# Patient Record
Sex: FEMALE | Race: WHITE | NOT HISPANIC OR LATINO | Employment: FULL TIME | URBAN - METROPOLITAN AREA
[De-identification: names, ages, dates, MRNs, and addresses within clinical notes are randomized per-mention and may not be internally consistent; named-entity substitution may affect disease eponyms.]

---

## 2024-07-23 LAB
EXTERNAL HEPATITIS B SURFACE ANTIGEN: NEGATIVE
EXTERNAL HEPATITIS C AB: NEGATIVE
EXTERNAL RUBELLA QUALITATIVE: NORMAL
EXTERNAL SYPHILIS RPR SCREEN: NORMAL
HIV1 P24 AG SERPL QL IA: NORMAL

## 2024-11-12 ENCOUNTER — HOSPITAL ENCOUNTER (OUTPATIENT)
Dept: ULTRASOUND IMAGING | Facility: HOSPITAL | Age: 34
Discharge: HOME | End: 2024-11-12
Admitting: STUDENT IN AN ORGANIZED HEALTH CARE EDUCATION/TRAINING PROGRAM
Payer: COMMERCIAL

## 2024-11-12 DIAGNOSIS — O30.049 DICHORIONIC DIAMNIOTIC TWIN PREGNANCY, ANTEPARTUM: ICD-10-CM

## 2024-11-12 DIAGNOSIS — O28.3 ABNORMAL FETAL ULTRASOUND: ICD-10-CM

## 2024-11-12 PROCEDURE — 93325 DOPPLER ECHO COLOR FLOW MAPG: CPT

## 2024-11-12 PROCEDURE — 76825 ECHO EXAM OF FETAL HEART: CPT

## 2024-11-12 PROCEDURE — 76827 ECHO EXAM OF FETAL HEART: CPT
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
## 2024-11-15 ENCOUNTER — APPOINTMENT (OUTPATIENT)
Dept: ULTRASOUND IMAGING | Facility: HOSPITAL | Age: 34
End: 2024-11-15
Payer: COMMERCIAL

## 2024-11-15 ENCOUNTER — HOSPITAL ENCOUNTER (INPATIENT)
Facility: HOSPITAL | Age: 34
LOS: 8 days | Discharge: HOME | End: 2024-11-23
Attending: OBSTETRICS & GYNECOLOGY | Admitting: OBSTETRICS & GYNECOLOGY
Payer: COMMERCIAL

## 2024-11-15 PROBLEM — O10.919 CHRONIC HYPERTENSION AFFECTING PREGNANCY: Status: ACTIVE | Noted: 2024-11-15

## 2024-11-15 PROBLEM — O42.919 PRETERM PREMATURE RUPTURE OF MEMBRANES (PPROM) DELIVERED, CURRENT HOSPITALIZATION: Status: ACTIVE | Noted: 2024-11-15

## 2024-11-15 PROBLEM — O99.213 OBESITY AFFECTING PREGNANCY IN THIRD TRIMESTER: Status: ACTIVE | Noted: 2024-11-15

## 2024-11-15 LAB
A1 MICROGLOB PLACENTAL VAG QL: POSITIVE
ABO GROUP BLD: NORMAL
ALBUMIN SERPL-MCNC: 3.6 G/DL (ref 3.5–5.2)
ALBUMIN/GLOB SERPL: 1 G/DL
ALP SERPL-CCNC: 100 U/L (ref 39–117)
ALT SERPL W P-5'-P-CCNC: 15 U/L (ref 1–33)
ANION GAP SERPL CALCULATED.3IONS-SCNC: 14 MMOL/L (ref 5–15)
AST SERPL-CCNC: 20 U/L (ref 1–32)
BACTERIA UR QL AUTO: ABNORMAL /HPF
BILIRUB SERPL-MCNC: 0.2 MG/DL (ref 0–1.2)
BILIRUB UR QL STRIP: NEGATIVE
BLD GP AB SCN SERPL QL: NEGATIVE
BUN SERPL-MCNC: 9 MG/DL (ref 6–20)
BUN/CREAT SERPL: 14.3 (ref 7–25)
CALCIUM SPEC-SCNC: 9.4 MG/DL (ref 8.6–10.5)
CHLORIDE SERPL-SCNC: 104 MMOL/L (ref 98–107)
CLARITY UR: CLEAR
CO2 SERPL-SCNC: 20 MMOL/L (ref 22–29)
COLOR UR: YELLOW
CREAT SERPL-MCNC: 0.63 MG/DL (ref 0.57–1)
CREAT UR-MCNC: 116.1 MG/DL
CREAT UR-MCNC: 194 MG/DL
DEPRECATED RDW RBC AUTO: 36.5 FL (ref 37–54)
EGFRCR SERPLBLD CKD-EPI 2021: 120.3 ML/MIN/1.73
ERYTHROCYTE [DISTWIDTH] IN BLOOD BY AUTOMATED COUNT: 11.2 % (ref 12.3–15.4)
GLOBULIN UR ELPH-MCNC: 3.6 GM/DL
GLUCOSE SERPL-MCNC: 69 MG/DL (ref 65–99)
GLUCOSE UR STRIP-MCNC: NEGATIVE MG/DL
HCT VFR BLD AUTO: 39.3 % (ref 34–46.6)
HGB BLD-MCNC: 13.4 G/DL (ref 12–15.9)
HGB UR QL STRIP.AUTO: NEGATIVE
HYALINE CASTS UR QL AUTO: ABNORMAL /LPF
KETONES UR QL STRIP: ABNORMAL
LEUKOCYTE ESTERASE UR QL STRIP.AUTO: NEGATIVE
MCH RBC QN AUTO: 30.7 PG (ref 26.6–33)
MCHC RBC AUTO-ENTMCNC: 34.1 G/DL (ref 31.5–35.7)
MCV RBC AUTO: 90.1 FL (ref 79–97)
NITRITE UR QL STRIP: NEGATIVE
PH UR STRIP.AUTO: 6 [PH] (ref 5–8)
PLATELET # BLD AUTO: 397 10*3/MM3 (ref 140–450)
PMV BLD AUTO: 10.4 FL (ref 6–12)
POTASSIUM SERPL-SCNC: 4.1 MMOL/L (ref 3.5–5.2)
PROT ?TM UR-MCNC: 22.8 MG/DL
PROT ?TM UR-MCNC: 49.1 MG/DL
PROT SERPL-MCNC: 7.2 G/DL (ref 6–8.5)
PROT UR QL STRIP: ABNORMAL
PROT/CREAT UR: 196.4 MG/G CREA (ref 0–200)
PROT/CREAT UR: 253.1 MG/G CREA (ref 0–200)
QT INTERVAL: 388 MS
QTC INTERVAL: 462 MS
RBC # BLD AUTO: 4.36 10*6/MM3 (ref 3.77–5.28)
RBC # UR STRIP: ABNORMAL /HPF
REF LAB TEST METHOD: ABNORMAL
RH BLD: POSITIVE
SODIUM SERPL-SCNC: 138 MMOL/L (ref 136–145)
SP GR UR STRIP: 1.02 (ref 1–1.03)
SQUAMOUS #/AREA URNS HPF: ABNORMAL /HPF
T&S EXPIRATION DATE: NORMAL
TREPONEMA PALLIDUM IGG+IGM AB [PRESENCE] IN SERUM OR PLASMA BY IMMUNOASSAY: NORMAL
UROBILINOGEN UR QL STRIP: ABNORMAL
WBC # UR STRIP: ABNORMAL /HPF
WBC NRBC COR # BLD AUTO: 15.65 10*3/MM3 (ref 3.4–10.8)

## 2024-11-15 PROCEDURE — 59025 FETAL NON-STRESS TEST: CPT | Performed by: OBSTETRICS & GYNECOLOGY

## 2024-11-15 PROCEDURE — 86901 BLOOD TYPING SEROLOGIC RH(D): CPT | Performed by: OBSTETRICS & GYNECOLOGY

## 2024-11-15 PROCEDURE — 84156 ASSAY OF PROTEIN URINE: CPT | Performed by: OBSTETRICS & GYNECOLOGY

## 2024-11-15 PROCEDURE — 86850 RBC ANTIBODY SCREEN: CPT | Performed by: OBSTETRICS & GYNECOLOGY

## 2024-11-15 PROCEDURE — 25810000003 LACTATED RINGERS PER 1000 ML: Performed by: OBSTETRICS & GYNECOLOGY

## 2024-11-15 PROCEDURE — 99232 SBSQ HOSP IP/OBS MODERATE 35: CPT | Performed by: OBSTETRICS & GYNECOLOGY

## 2024-11-15 PROCEDURE — 80053 COMPREHEN METABOLIC PANEL: CPT | Performed by: OBSTETRICS & GYNECOLOGY

## 2024-11-15 PROCEDURE — 82570 ASSAY OF URINE CREATININE: CPT | Performed by: OBSTETRICS & GYNECOLOGY

## 2024-11-15 PROCEDURE — 99222 1ST HOSP IP/OBS MODERATE 55: CPT | Performed by: STUDENT IN AN ORGANIZED HEALTH CARE EDUCATION/TRAINING PROGRAM

## 2024-11-15 PROCEDURE — 84112 EVAL AMNIOTIC FLUID PROTEIN: CPT | Performed by: OBSTETRICS & GYNECOLOGY

## 2024-11-15 PROCEDURE — 85027 COMPLETE CBC AUTOMATED: CPT | Performed by: OBSTETRICS & GYNECOLOGY

## 2024-11-15 PROCEDURE — 93010 ELECTROCARDIOGRAM REPORT: CPT | Performed by: INTERNAL MEDICINE

## 2024-11-15 PROCEDURE — 99202 OFFICE O/P NEW SF 15 MIN: CPT | Performed by: OBSTETRICS & GYNECOLOGY

## 2024-11-15 PROCEDURE — 86780 TREPONEMA PALLIDUM: CPT | Performed by: OBSTETRICS & GYNECOLOGY

## 2024-11-15 PROCEDURE — 76819 FETAL BIOPHYS PROFIL W/O NST: CPT

## 2024-11-15 PROCEDURE — 25010000002 AMPICILLIN PER 500 MG: Performed by: OBSTETRICS & GYNECOLOGY

## 2024-11-15 PROCEDURE — 86900 BLOOD TYPING SEROLOGIC ABO: CPT | Performed by: OBSTETRICS & GYNECOLOGY

## 2024-11-15 PROCEDURE — 93005 ELECTROCARDIOGRAM TRACING: CPT | Performed by: OBSTETRICS & GYNECOLOGY

## 2024-11-15 PROCEDURE — 87086 URINE CULTURE/COLONY COUNT: CPT | Performed by: OBSTETRICS & GYNECOLOGY

## 2024-11-15 PROCEDURE — 76819 FETAL BIOPHYS PROFIL W/O NST: CPT | Performed by: OBSTETRICS & GYNECOLOGY

## 2024-11-15 PROCEDURE — 25010000002 BETAMETHASONE ACET & SOD PHOS PER 4 MG: Performed by: OBSTETRICS & GYNECOLOGY

## 2024-11-15 PROCEDURE — 81001 URINALYSIS AUTO W/SCOPE: CPT | Performed by: OBSTETRICS & GYNECOLOGY

## 2024-11-15 PROCEDURE — 25010000002 MAGNESIUM SULFATE 20 GM/500ML SOLUTION: Performed by: OBSTETRICS & GYNECOLOGY

## 2024-11-15 RX ORDER — CARVEDILOL 12.5 MG/1
12.5 TABLET ORAL 2 TIMES DAILY WITH MEALS
COMMUNITY
End: 2024-11-23 | Stop reason: HOSPADM

## 2024-11-15 RX ORDER — SODIUM CHLORIDE 0.9 % (FLUSH) 0.9 %
10 SYRINGE (ML) INJECTION AS NEEDED
Status: DISCONTINUED | OUTPATIENT
Start: 2024-11-15 | End: 2024-11-22

## 2024-11-15 RX ORDER — MAGNESIUM SULFATE HEPTAHYDRATE 40 MG/ML
2 INJECTION, SOLUTION INTRAVENOUS CONTINUOUS
Status: DISCONTINUED | OUTPATIENT
Start: 2024-11-15 | End: 2024-11-16

## 2024-11-15 RX ORDER — NIFEDIPINE 30 MG/1
30 TABLET, EXTENDED RELEASE ORAL
Status: DISCONTINUED | OUTPATIENT
Start: 2024-11-16 | End: 2024-11-17

## 2024-11-15 RX ORDER — ONDANSETRON 2 MG/ML
4 INJECTION INTRAMUSCULAR; INTRAVENOUS EVERY 8 HOURS PRN
Status: DISCONTINUED | OUTPATIENT
Start: 2024-11-15 | End: 2024-11-22

## 2024-11-15 RX ORDER — AMOXICILLIN 250 MG/1
500 CAPSULE ORAL EVERY 8 HOURS
Status: COMPLETED | OUTPATIENT
Start: 2024-11-17 | End: 2024-11-22

## 2024-11-15 RX ORDER — NIFEDIPINE 10 MG/1
10 CAPSULE ORAL ONCE
Status: COMPLETED | OUTPATIENT
Start: 2024-11-15 | End: 2024-11-15

## 2024-11-15 RX ORDER — ONDANSETRON 4 MG/1
8 TABLET, ORALLY DISINTEGRATING ORAL EVERY 8 HOURS PRN
Status: DISCONTINUED | OUTPATIENT
Start: 2024-11-15 | End: 2024-11-22

## 2024-11-15 RX ORDER — SODIUM CHLORIDE, SODIUM LACTATE, POTASSIUM CHLORIDE, CALCIUM CHLORIDE 600; 310; 30; 20 MG/100ML; MG/100ML; MG/100ML; MG/100ML
75 INJECTION, SOLUTION INTRAVENOUS CONTINUOUS
Status: DISCONTINUED | OUTPATIENT
Start: 2024-11-15 | End: 2024-11-16

## 2024-11-15 RX ORDER — BETAMETHASONE SODIUM PHOSPHATE AND BETAMETHASONE ACETATE 3; 3 MG/ML; MG/ML
12 INJECTION, SUSPENSION INTRA-ARTICULAR; INTRALESIONAL; INTRAMUSCULAR; SOFT TISSUE EVERY 24 HOURS
Status: COMPLETED | OUTPATIENT
Start: 2024-11-15 | End: 2024-11-16

## 2024-11-15 RX ORDER — DOCUSATE SODIUM 100 MG/1
100 CAPSULE, LIQUID FILLED ORAL 2 TIMES DAILY PRN
Status: DISCONTINUED | OUTPATIENT
Start: 2024-11-15 | End: 2024-11-22

## 2024-11-15 RX ORDER — NIFEDIPINE 30 MG/1
30 TABLET, EXTENDED RELEASE ORAL
Status: DISCONTINUED | OUTPATIENT
Start: 2024-11-16 | End: 2024-11-15

## 2024-11-15 RX ORDER — BISACODYL 10 MG
10 SUPPOSITORY, RECTAL RECTAL DAILY PRN
Status: DISCONTINUED | OUTPATIENT
Start: 2024-11-15 | End: 2024-11-22

## 2024-11-15 RX ORDER — CARVEDILOL 12.5 MG/1
12.5 TABLET ORAL 2 TIMES DAILY WITH MEALS
Status: DISCONTINUED | OUTPATIENT
Start: 2024-11-15 | End: 2024-11-18

## 2024-11-15 RX ORDER — PRENATAL VIT/IRON FUM/FOLIC AC 27MG-0.8MG
1 TABLET ORAL DAILY
Status: DISCONTINUED | OUTPATIENT
Start: 2024-11-16 | End: 2024-11-23 | Stop reason: HOSPADM

## 2024-11-15 RX ORDER — CALCIUM GLUCONATE 94 MG/ML
1 INJECTION, SOLUTION INTRAVENOUS ONCE AS NEEDED
Status: DISCONTINUED | OUTPATIENT
Start: 2024-11-15 | End: 2024-11-16

## 2024-11-15 RX ORDER — NIFEDIPINE 30 MG/1
30 TABLET, EXTENDED RELEASE ORAL 2 TIMES DAILY
Status: DISCONTINUED | OUTPATIENT
Start: 2024-11-16 | End: 2024-11-15

## 2024-11-15 RX ORDER — AZITHROMYCIN 250 MG/1
1000 TABLET, FILM COATED ORAL ONCE
Status: COMPLETED | OUTPATIENT
Start: 2024-11-15 | End: 2024-11-15

## 2024-11-15 RX ORDER — ACETAMINOPHEN 325 MG/1
650 TABLET ORAL EVERY 4 HOURS PRN
Status: DISCONTINUED | OUTPATIENT
Start: 2024-11-15 | End: 2024-11-22

## 2024-11-15 RX ADMIN — ACETAMINOPHEN 325MG 650 MG: 325 TABLET ORAL at 14:42

## 2024-11-15 RX ADMIN — AZITHROMYCIN 1000 MG: 250 TABLET, FILM COATED ORAL at 14:32

## 2024-11-15 RX ADMIN — NIFEDIPINE 10 MG: 10 CAPSULE ORAL at 15:09

## 2024-11-15 RX ADMIN — SODIUM CHLORIDE, SODIUM LACTATE, POTASSIUM CHLORIDE, CALCIUM CHLORIDE 75 ML/HR: 20; 30; 600; 310 INJECTION, SOLUTION INTRAVENOUS at 12:28

## 2024-11-15 RX ADMIN — AMPICILLIN SODIUM 2000 MG: 2 INJECTION, POWDER, FOR SOLUTION INTRAMUSCULAR; INTRAVENOUS at 18:36

## 2024-11-15 RX ADMIN — MAGNESIUM SULFATE IN WATER 2 G/HR: 20 INJECTION, SOLUTION INTRAVENOUS at 20:21

## 2024-11-15 RX ADMIN — BETAMETHASONE SODIUM PHOSPHATE AND BETAMETHASONE ACETATE 12 MG: 3; 3 INJECTION, SUSPENSION INTRA-ARTICULAR; INTRALESIONAL; INTRAMUSCULAR at 11:37

## 2024-11-15 RX ADMIN — CARVEDILOL 12.5 MG: 12.5 TABLET, FILM COATED ORAL at 14:32

## 2024-11-15 RX ADMIN — AMPICILLIN SODIUM 2000 MG: 2 INJECTION, POWDER, FOR SOLUTION INTRAMUSCULAR; INTRAVENOUS at 13:00
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
## 2024-11-15 NOTE — PLAN OF CARE
Problem: Adult Inpatient Plan of Care  Goal: Plan of Care Review  Outcome: Progressing  Flowsheets (Taken 11/15/2024 1746)  Progress: improving  Outcome Evaluation: Patient admitted for PROM. Magnesium started for neuroprotection. Blood pressures WDL.  Plan of Care Reviewed With:   patient   spouse   family  Goal: Patient-Specific Goal (Individualized)  Outcome: Progressing  Flowsheets (Taken 11/15/2024 1746)  Patient/Family-Specific Goals (Include Timeframe): Prolonged delivery for 6 weeks  Individualized Care Needs: Blood pressures WDL and no contractions noted  Anxieties, Fears or Concerns: First Babies  Goal: Absence of Hospital-Acquired Illness or Injury  Outcome: Progressing  Intervention: Identify and Manage Fall Risk  Description: Perform standard risk assessment on admission using a validated tool or comprehensive approach appropriate to the patient; reassess fall risk frequently, with change in status or transfer to another level of care.  Communicate risk to interprofessional healthcare team; ensure fall risk visible cue.  Determine need for increased observation, equipment and environmental modification, as well as use of supportive, nonskid footwear.  Adjust safety measures to individual needs and identified risk factors.  Reinforce the importance of active participation with fall risk prevention, safety, and physical activity with the patient and family.  Perform regular intentional rounding to assess need for position change, pain assessment and personal needs, including assistance with toileting.  Recent Flowsheet Documentation  Taken 11/15/2024 1232 by Navin Ware RN  Safety Promotion/Fall Prevention:   safety round/check completed   room organization consistent   nonskid shoes/slippers when out of bed   fall prevention program maintained   clutter free environment maintained   assistive device/personal items within reach  Intervention: Prevent and Manage VTE (Venous Thromboembolism)  Risk  Description: Assess for VTE (venous thromboembolism) risk.  Promote early mobilization; encourage both active and passive leg exercises, if unable to ambulate.  Initiate and maintain compression or other therapy, as indicated, based on identified risk in accordance with organizational protocol and provider order.  Recognize the patient's individual risk for bleeding before initiating pharmacologic thromboprophylaxis.  Recent Flowsheet Documentation  Taken 11/15/2024 1300 by Navin Ware RN  VTE Prevention/Management:   SCDs (sequential compression devices) on   bilateral  Goal: Optimal Comfort and Wellbeing  Outcome: Progressing  Intervention: Provide Person-Centered Care  Description: Use a family-focused approach to care; encourage support system presence and participation.  Develop trust and rapport by proactively providing information, encouraging questions, addressing concerns and offering reassurance.  Acknowledge emotional response to hospitalization.  Recognize and utilize personal coping strategies and strengths; develop goals via shared decision-making.  Honor spiritual and cultural preferences.  Recent Flowsheet Documentation  Taken 11/15/2024 1232 by Navin Ware RN  Trust Relationship/Rapport:   care explained   emotional support provided   choices provided   empathic listening provided   questions answered   questions encouraged   reassurance provided   thoughts/feelings acknowledged  Goal: Readiness for Transition of Care  Outcome: Progressing  Intervention: Mutually Develop Transition Plan  Description: Identify available resources for support (e.g., family, friends, community).  Identify and address barriers to ongoing treatment and home management (e.g., environmental, financial).  Provide opportunities to practice self-management skills.  Assess and monitor emotional readiness for transition.  Establish or reconnect linkage with outpatient providers or community-based services.  Recent  Flowsheet Documentation  Taken 11/15/2024 1126 by Navin Ware, RN  Equipment Currently Used at Home: none  Taken 11/15/2024 1121 by Navin Ware, RN  Transportation Anticipated: family or friend will provide  Patient/Family Anticipated Services at Transition: none  Patient/Family Anticipates Transition to: home with family   Goal Outcome Evaluation:  Plan of Care Reviewed With: patient, spouse, family        Progress: improving  Outcome Evaluation: Patient admitted for PROM. Magnesium started for neuroprotection. Blood pressures WDL.
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
## 2024-11-15 NOTE — CONSULTS
PRENATAL CONSULTATION NOTE     DATE: 11/15/2024  MRN: [id number]      Patient Name: Nancy Cormier  YOB: 1990    Requesting Physician:  Compa    EDC: 2025, by Other Basis    GA: 28w0d    Estimated fetal weight: unknown    Reason for Consultation: potential premature twins at 28 weeks gestation    Maternal History: 33 y.o.  with chronic HTN, di-di twin gestation, premature rupture of membranes, VSD of Twin B    Consult:  paternal grandmother, maternal grandmother, and mother available for discussion.  We reviewed the fetal and  aspects of the above conditions to include: estimated survival rate, estimated intact survival rate, respiratory distress syndrome, transient tachypnea of the , beneficial effects of steroids, early onset of sepsis, intraventricular hemorrhage, cerebral palsy, mental retardation, retinopathy of prematurity, deafness, chronic lung disease, necrotizing enterocolitis, late onset sepsis, apnea of prematurity, anemia of prematurity, feeding difficulty, temperature instability, and jaundice    Plan for Resuscitation: full resuscitation    Offered estimation of prognosis of potential length of infant hospitalization.     We will plan to attend delivery if requested.     I discussed the importance of providing human milk for all infants, especially premature infants. The patient does plan on providing pumped breast milk and/or nursing when appropriate.    I also reviewed policies and procedures for NICU/ICN admission and reviewed structure and function of [facility] at [facility].    Thank you for allowing us to participate in the care of this patient. [facility] always available for follow-up consultation as indicated. Please do not hesitate to call for additional questions or issues.    Additional Comments: Mother presented today of PROM--at the time of consult undetermined which twin is ruptured, receiving latency abx, BMZ and on mag sulfate.  History of CHTN---on carvedilol and procardia. Expecting 2 male infants, names to be determined. Twin B with known apical muscular VSD--will plan for echo after delivery when clinically appropriate.    AYAN Sesay   Nurse Practitioner  [facility] - Neonatology  [facility]  11/15/24 @ 16:16 EST    Consult note reviewed and electronically signed on 11/15/2024 at 16:16 EST    INITIAL CONSULT:  A total of 45 minutes were spent on counseling and coordination of care including discussion with physicians and nursing, and reviewing the findings and recommendations with the patient and her family of which 50 percent were spent face-to-face with the patient during this encounter.    Thank you for requesting this consult. Please contact the [facility] on-call  Provider for any further questions/concerns, by calling the NICU at extension [phone].        DISCLAIMER:       At [facility], we believe that sharing information builds trust and better relationships. You are receiving this note because you or your baby are receiving care at [facility] or recently visited. It is possible you will see health information before a provider has talked with you about it. This kind of information can be easy to misunderstand. To help you fully understand what it means for your health, we urge you to discuss this note with your provider.
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
## 2024-11-16 LAB — BACTERIA SPEC AEROBE CULT: NORMAL

## 2024-11-16 PROCEDURE — 25010000002 AMPICILLIN PER 500 MG: Performed by: OBSTETRICS & GYNECOLOGY

## 2024-11-16 PROCEDURE — 25010000002 MAGNESIUM SULFATE 20 GM/500ML SOLUTION: Performed by: OBSTETRICS & GYNECOLOGY

## 2024-11-16 PROCEDURE — 25810000003 LACTATED RINGERS PER 1000 ML: Performed by: OBSTETRICS & GYNECOLOGY

## 2024-11-16 PROCEDURE — 59025 FETAL NON-STRESS TEST: CPT

## 2024-11-16 PROCEDURE — 25010000002 BETAMETHASONE ACET & SOD PHOS PER 4 MG: Performed by: OBSTETRICS & GYNECOLOGY

## 2024-11-16 RX ORDER — ASPIRIN 81 MG/1
81 TABLET ORAL DAILY
Status: DISCONTINUED | OUTPATIENT
Start: 2024-11-16 | End: 2024-11-22

## 2024-11-16 RX ADMIN — CARVEDILOL 12.5 MG: 12.5 TABLET, FILM COATED ORAL at 18:20

## 2024-11-16 RX ADMIN — ASPIRIN 81 MG: 81 TABLET, COATED ORAL at 18:20

## 2024-11-16 RX ADMIN — AMPICILLIN SODIUM 2000 MG: 2 INJECTION, POWDER, FOR SOLUTION INTRAMUSCULAR; INTRAVENOUS at 18:17

## 2024-11-16 RX ADMIN — BETAMETHASONE SODIUM PHOSPHATE AND BETAMETHASONE ACETATE 12 MG: 3; 3 INJECTION, SUSPENSION INTRA-ARTICULAR; INTRALESIONAL; INTRAMUSCULAR at 11:44

## 2024-11-16 RX ADMIN — DOCUSATE SODIUM 100 MG: 100 CAPSULE, LIQUID FILLED ORAL at 18:28

## 2024-11-16 RX ADMIN — MAGNESIUM SULFATE IN WATER 2 G/HR: 20 INJECTION, SOLUTION INTRAVENOUS at 07:03

## 2024-11-16 RX ADMIN — Medication 1 TABLET: at 09:00

## 2024-11-16 RX ADMIN — AMPICILLIN SODIUM 2000 MG: 2 INJECTION, POWDER, FOR SOLUTION INTRAMUSCULAR; INTRAVENOUS at 00:34

## 2024-11-16 RX ADMIN — CARVEDILOL 12.5 MG: 12.5 TABLET, FILM COATED ORAL at 09:00

## 2024-11-16 RX ADMIN — NIFEDIPINE 30 MG: 30 TABLET, FILM COATED, EXTENDED RELEASE ORAL at 09:00

## 2024-11-16 RX ADMIN — AMPICILLIN SODIUM 2000 MG: 2 INJECTION, POWDER, FOR SOLUTION INTRAMUSCULAR; INTRAVENOUS at 06:08

## 2024-11-16 RX ADMIN — SODIUM CHLORIDE, SODIUM LACTATE, POTASSIUM CHLORIDE, CALCIUM CHLORIDE 75 ML/HR: 20; 30; 600; 310 INJECTION, SOLUTION INTRAVENOUS at 04:14

## 2024-11-16 RX ADMIN — AMPICILLIN SODIUM 2000 MG: 2 INJECTION, POWDER, FOR SOLUTION INTRAMUSCULAR; INTRAVENOUS at 11:44

## 2024-11-16 NOTE — PLAN OF CARE
Goal Outcome Evaluation:  Plan of Care Reviewed With: patient, significant other        Progress: no change

## 2024-11-16 NOTE — PROGRESS NOTES
[facility]  Obstetric Progress Note    Patient Name: Nancy Cormier  :  1990  MRN:  [id number]  Hospital Day: 1  EGA: 28w1d      Subjective   Feels well this morning. Had some low back pain last night. Denies cramping, bleeding, chest pain, shortness of breath, headache, vision changes, RUQ pain. Good FM x both babies.      Objective     VS:  Vital Signs Range for the last 24 hours  Temperature: Temp:  [97.8 °F (36.6 °C)-99.2 °F (37.3 °C)] 97.9 °F (36.6 °C)   Temp Source: Temp src: Oral   BP: BP: ()/() 143/94   Pulse: Heart Rate:  [] 82   Respirations: Resp:  [16-20] 18                   Physical Examination:     General :  Alert in NAD  Abdomen: Gravid, Fundus - nontender    SCE: 1/80/-1 on last check 11/15/24       Lab results reviewed:  CBC:   Lab Results   Component Value Date    WBC 15.65 (H) 11/15/2024    HGB 13.4 11/15/2024    HCT 39.3 11/15/2024          Lab Results   Component Value Date    GLUCOSE 69 11/15/2024    BUN 9 11/15/2024    CREATININE 0.63 11/15/2024     11/15/2024    K 4.1 11/15/2024     11/15/2024    CALCIUM 9.4 11/15/2024    PROTEINTOT 7.2 11/15/2024    ALBUMIN 3.6 11/15/2024    ALT 15 11/15/2024    AST 20 11/15/2024    ALKPHOS 100 11/15/2024    BILITOT 0.2 11/15/2024    GLOB 3.6 11/15/2024    AGRATIO 1.0 11/15/2024    BCR 14.3 11/15/2024    ANIONGAP 14.0 11/15/2024    EGFR 120.3 11/15/2024     Lab Results   Component Value Date    CREATININEUR 116.1 11/15/2024    PROTEINUR 22.8 11/15/2024    UTPCR 196.4 11/15/2024       ROM+ positive      A/P: 33 y.o.  at 28w1d with di/di twin gestation admitted with PPROM (twin A)    PPROM - occurred 11/15/24 @ 0930  Latency antibiotics, day 2  Mag for NP, may discontinue later today  BMZ #1 on 11/15/24 at 1137, 2nd dose scheduled for this AM  Inpatient until delivery at 34wks or earlier if indicated    Fetal status  Di/di twin gestation  CEFM, reactive and reassuring - may de-escalate to 1hr NST TID if off  mag  BMJORGITO as above for FLM    Apical VSD Twin B  Appreciate MFM input  S/p fetal ECHO  OK for delivery at [facility] per Dr. Rodriguez  Plan  ECHO    CHTN  Continue Carvedilol 25mg BID and ProcardiaXL 30mg QD  Weekly PIH labs  Continue LDA    H/o SVT  S/p Cardiology and MFM consults - appreciate recs  EKG - sinus rhythm, delayed R wave progression per Cardiology note  Normal ECHO in past  Plan telemetry during labor (if vaginal delivery) and for 6hrs postpartum     BMI 42  SCDs while hospitalized     Prenatal care  Due for 1hr GCT, will plan after completion of steroids        Dispo: L&D until through steroid window, then may consider transfer to antepartum if remains stable. Will be inpatient until delivery.          premature rupture of membranes in third trimester    Chronic hypertension affecting pregnancy    Obesity affecting pregnancy in third trimester              Ela Morton MD  2024  11:25 EST

## 2024-11-16 NOTE — PROGRESS NOTES
S/p BMZ 11/15-  Mag has been running for approx 24h  Pt is stable with no signs of  labor, no contractions, bleeding or discomfort  FHT reactive and reassuring x 2    OK to discontinue mag for NP for now  Will d/c CEFM and deescalate to 1hr NST TID for fetal surveillance.  Could consider Indocin or Procardia for tocolysis if contractions start prior to completion of steroid window ( @ 1145). Would restart mag if delivery appears imminent.    Ela Morton MD  [facility]  24 14:22 EST

## 2024-11-17 PROCEDURE — 25810000003 SODIUM CHLORIDE 0.9 % SOLUTION 250 ML FLEX CONT: Performed by: STUDENT IN AN ORGANIZED HEALTH CARE EDUCATION/TRAINING PROGRAM

## 2024-11-17 PROCEDURE — 59025 FETAL NON-STRESS TEST: CPT

## 2024-11-17 PROCEDURE — 25010000002 AMPICILLIN PER 500 MG: Performed by: OBSTETRICS & GYNECOLOGY

## 2024-11-17 RX ORDER — NIFEDIPINE 10 MG/1
10 CAPSULE ORAL ONCE
Status: COMPLETED | OUTPATIENT
Start: 2024-11-17 | End: 2024-11-17

## 2024-11-17 RX ORDER — SODIUM CHLORIDE 9 MG/ML
40 INJECTION, SOLUTION INTRAVENOUS AS NEEDED
Status: DISCONTINUED | OUTPATIENT
Start: 2024-11-16 | End: 2024-11-22

## 2024-11-17 RX ORDER — NIFEDIPINE 30 MG/1
30 TABLET, EXTENDED RELEASE ORAL 2 TIMES DAILY
Status: DISCONTINUED | OUTPATIENT
Start: 2024-11-17 | End: 2024-11-19

## 2024-11-17 RX ORDER — SODIUM CHLORIDE 0.9 % (FLUSH) 0.9 %
10 SYRINGE (ML) INJECTION EVERY 12 HOURS SCHEDULED
Status: DISCONTINUED | OUTPATIENT
Start: 2024-11-17 | End: 2024-11-22

## 2024-11-17 RX ORDER — SODIUM CHLORIDE 0.9 % (FLUSH) 0.9 %
10 SYRINGE (ML) INJECTION AS NEEDED
Status: DISCONTINUED | OUTPATIENT
Start: 2024-11-16 | End: 2024-11-22

## 2024-11-17 RX ADMIN — CARVEDILOL 12.5 MG: 12.5 TABLET, FILM COATED ORAL at 19:22

## 2024-11-17 RX ADMIN — Medication 10 ML: at 20:48

## 2024-11-17 RX ADMIN — SODIUM CHLORIDE 40 ML: 9 INJECTION, SOLUTION INTRAVENOUS at 06:20

## 2024-11-17 RX ADMIN — Medication 10 ML: at 00:10

## 2024-11-17 RX ADMIN — AMOXICILLIN 500 MG: 250 CAPSULE ORAL at 19:22

## 2024-11-17 RX ADMIN — Medication 10 ML: at 01:10

## 2024-11-17 RX ADMIN — SODIUM CHLORIDE 40 ML: 9 INJECTION, SOLUTION INTRAVENOUS at 00:12

## 2024-11-17 RX ADMIN — NIFEDIPINE 30 MG: 30 TABLET, FILM COATED, EXTENDED RELEASE ORAL at 20:37

## 2024-11-17 RX ADMIN — ASPIRIN 81 MG: 81 TABLET, COATED ORAL at 09:13

## 2024-11-17 RX ADMIN — AMOXICILLIN 500 MG: 250 CAPSULE ORAL at 11:10

## 2024-11-17 RX ADMIN — AMPICILLIN SODIUM 2000 MG: 2 INJECTION, POWDER, FOR SOLUTION INTRAMUSCULAR; INTRAVENOUS at 06:19

## 2024-11-17 RX ADMIN — AMPICILLIN SODIUM 2000 MG: 2 INJECTION, POWDER, FOR SOLUTION INTRAMUSCULAR; INTRAVENOUS at 00:10

## 2024-11-17 RX ADMIN — NIFEDIPINE 30 MG: 30 TABLET, FILM COATED, EXTENDED RELEASE ORAL at 09:13

## 2024-11-17 RX ADMIN — Medication 1 TABLET: at 09:13

## 2024-11-17 RX ADMIN — CARVEDILOL 12.5 MG: 12.5 TABLET, FILM COATED ORAL at 09:13

## 2024-11-17 RX ADMIN — Medication 10 ML: at 06:18

## 2024-11-17 RX ADMIN — NIFEDIPINE 10 MG: 10 CAPSULE ORAL at 11:23

## 2024-11-17 NOTE — PLAN OF CARE
Goal Outcome Evaluation:  Plan of Care Reviewed With: patient        Progress: no change  Outcome Evaluation: VSS, pt remained afebrile with no severe range blood pressures obtained throughout the night. AM NST in progress. Pt denies VB, contractions, abdominal tenderness, HA, Visual changes, RUQ/epigastric pain. Last dose of IV antibiotics currently infusing then pt will switch to PO antibiotics as ordered. Pt denies LOF at this time. Educated on PPROM and S/S of PTL/infection and to notify RN with any concerns. No needs or concerns identified over night.

## 2024-11-17 NOTE — NON STRESS TEST
Nancy WHIPPLE Berryraoulgloriabarber, a  at 28w2d with an TYRELL of 2025, by [employer], was seen at [address] for a nonstress test.    Chief Complaint   Patient presents with    Rupture of Membranes     MARBIN - Patient states she had a large gush around 0930 today. She has had some light trickling off and on since. +FM, -Vaginal bleeding but had some brown discharge yesterday.        Patient Active Problem List   Diagnosis     premature rupture of membranes in third trimester    Chronic hypertension affecting pregnancy    Obesity affecting pregnancy in third trimester       Start Time: 609  Stop Time: 722    Interpretation A  Nonstress Test Interpretation A: Reactive  Interpretation B  Nonstress Test Interpretation B: Reactive

## 2024-11-17 NOTE — PROGRESS NOTES
[facility]  Obstetric Progress Note    Patient Name: Nancy Cormier  :  1990  MRN:  [id number]  Hospital Day: 2  EGA: 28w1d      Subjective   Feels well this morning. Denies cramping, bleeding, chest pain, shortness of breath, headache, vision changes, RUQ pain. Good FM x both babies.      Objective     VS:  Vital Signs Range for the last 24 hours  Temperature: Temp:  [98 °F (36.7 °C)-98.9 °F (37.2 °C)] 98.4 °F (36.9 °C)   Temp Source: Temp src: Oral   BP: BP: (127-177)/() 170/90   Pulse: Heart Rate:  [75-93] 83   Respirations: Resp:  [16-20] 18                   Physical Examination:     General :  Alert in NAD  Abdomen: Gravid, Fundus - nontender    SCE: /-1 on last check 11/15/24       Results from last 7 days   Lab Units 11/15/24  1510 11/15/24  1155   WBC 10*3/mm3  --  15.65*   HEMOGLOBIN g/dL  --  13.4   PLATELETS 10*3/mm3  --  397   POTASSIUM mmol/L  --  4.1   ALT (SGPT) U/L  --  15   AST (SGOT) U/L  --  20   CREATININE mg/dL  --  0.63   BILIRUBIN mg/dL  --  0.2   PROT/CREAT RATIO UR mg/G Crea 196.4  --          ROM+ positive      A/P: 33 y.o.  at 28w1d with di/di twin gestation admitted with PPROM (twin A)    PPROM - occurred 11/15/24 @ 0930  Latency antibiotics, day 3  S/p Mag for NP,- to restart if delivery is imminent   S/p BMZ on 11/15/24 & 24 at 1137,   Inpatient until delivery at 34wks or earlier if indicated    Fetal status  Di/di twin gestation   NST TID - reassuring.   BMZ as above for FLM    Apical VSD Twin B  Appreciate MFM input  S/p fetal ECHO  OK for delivery at [facility] per Dr. Rodriguez  Plan  ECHO    CHTN - had elevated BP with severe range SBP this am before her medication was given and then SBP @ 160 1 hr later. Will change procardia to BID. No evidence of pre-eclampsia at this time.   Continue Carvedilol 25mg BID and ProcardiaXL 30mg BID  Weekly PIH labs  Continue LDA    H/o SVT  S/p Cardiology and MFM consults - appreciate recs  EKG - sinus  rhythm, delayed R wave progression per Cardiology note  Normal ECHO in past  Plan telemetry during labor (if vaginal delivery) and for 6hrs postpartum     BMI 42  SCDs while hospitalized     Prenatal care  Due for 1hr GCT, will plan after completion of steroids              premature rupture of membranes in third trimester    Chronic hypertension affecting pregnancy    Obesity affecting pregnancy in third trimester              Basil Chatman MD  2024  11:00 EST

## 2024-11-17 NOTE — NON STRESS TEST
Nancy SARABJIT Xiebarber, a  at 28w1d with an TYRELL of 2025, by [employer], was seen at [address] for a nonstress test.    Chief Complaint   Patient presents with    Rupture of Membranes     MARBIN - Patient states she had a large gush around 0930 today. She has had some light trickling off and on since. +FM, -Vaginal bleeding but had some brown discharge yesterday.        Patient Active Problem List   Diagnosis     premature rupture of membranes in third trimester    Chronic hypertension affecting pregnancy    Obesity affecting pregnancy in third trimester       Start Time:     Stop Time:

## 2024-11-18 PROCEDURE — 59025 FETAL NON-STRESS TEST: CPT

## 2024-11-18 RX ORDER — CARVEDILOL 12.5 MG/1
12.5 TABLET ORAL ONCE
Status: COMPLETED | OUTPATIENT
Start: 2024-11-18 | End: 2024-11-18

## 2024-11-18 RX ORDER — CARVEDILOL 25 MG/1
25 TABLET ORAL 2 TIMES DAILY WITH MEALS
Status: DISCONTINUED | OUTPATIENT
Start: 2024-11-18 | End: 2024-11-23 | Stop reason: HOSPADM

## 2024-11-18 RX ADMIN — AMOXICILLIN 500 MG: 250 CAPSULE ORAL at 18:12

## 2024-11-18 RX ADMIN — CARVEDILOL 12.5 MG: 12.5 TABLET, FILM COATED ORAL at 00:45

## 2024-11-18 RX ADMIN — Medication 1 TABLET: at 10:21

## 2024-11-18 RX ADMIN — CARVEDILOL 25 MG: 25 TABLET, FILM COATED ORAL at 07:53

## 2024-11-18 RX ADMIN — AMOXICILLIN 500 MG: 250 CAPSULE ORAL at 10:27

## 2024-11-18 RX ADMIN — ASPIRIN 81 MG: 81 TABLET, COATED ORAL at 10:21

## 2024-11-18 RX ADMIN — AMOXICILLIN 500 MG: 250 CAPSULE ORAL at 03:38

## 2024-11-18 RX ADMIN — Medication 10 ML: at 10:22

## 2024-11-18 RX ADMIN — NIFEDIPINE 30 MG: 30 TABLET, FILM COATED, EXTENDED RELEASE ORAL at 10:21

## 2024-11-18 RX ADMIN — CARVEDILOL 25 MG: 25 TABLET, FILM COATED ORAL at 18:13

## 2024-11-18 RX ADMIN — NIFEDIPINE 30 MG: 30 TABLET, FILM COATED, EXTENDED RELEASE ORAL at 20:29

## 2024-11-18 RX ADMIN — Medication 10 ML: at 20:29
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
## 2024-11-18 NOTE — NON STRESS TEST
Nancy WHIPPLE Berryraoulgloriabarber, a  at 28w2d with an TYRELL of 2025, by Other Basis, was seen at [address] for a nonstress test.    Chief Complaint   Patient presents with    Rupture of Membranes     MARBIN - Patient states she had a large gush around 0930 today. She has had some light trickling off and on since. +FM, -Vaginal bleeding but had some brown discharge yesterday.        Patient Active Problem List   Diagnosis     premature rupture of membranes in third trimester    Chronic hypertension affecting pregnancy    Obesity affecting pregnancy in third trimester       Start Time: 1643  Stop Time:     Interpretation A  Nonstress Test Interpretation A: Reactive  Interpretation B  Nonstress Test Interpretation B: Reactive

## 2024-11-18 NOTE — PLAN OF CARE
Goal Outcome Evaluation:  Plan of Care Reviewed With: patient        Progress: no change  Outcome Evaluation: Pt remained afebrile overnight. Severe range blood pressures obtained, but it was discovered that pt was taking the incorrect dose of Coreg. After an additional dose was administered subsequent BPs have remained below severe range. Pt declines VB, contractions, HA, visual changes, RUQ/Epigastric pain. Pt reports leaking small amount of clear, odorless fluid. RNST obtained and +FM reported for both babies. AM NST will be initiated shortly. No needs or complaints identified overnight.

## 2024-11-18 NOTE — PLAN OF CARE
Goal Outcome Evaluation:              Outcome Evaluation: RNSTx2 for both babies today, +FM was reported, but FKC incomplete at time of this note. Pt BPs today were 143/95, 131/92, 155/105 and 152/98. Scant amounts of clear fluid still reported--mainly when she ambulates or changes position. Pt had several visitors today, her BP medication has bee corrected and will hopefully level her BP trends out. Her 1hr gtt is scheduled for 11/23/24--1 week after her second dose of BMZ. Pt to have BPP tomorrow. Pt remains in good spirits despite long inpatient stay and is keeping a positive frame of mind appropriate to her admission reason.

## 2024-11-18 NOTE — NON STRESS TEST
Nancy WHIPPLE Rojasbarber, a  at 28w3d with an TYRELL of 2025, by Other Basis, was seen at [address] for a nonstress test.    Chief Complaint   Patient presents with    Rupture of Membranes     MARBIN - Patient states she had a large gush around 0930 today. She has had some light trickling off and on since. +FM, -Vaginal bleeding but had some brown discharge yesterday.        Patient Active Problem List   Diagnosis     premature rupture of membranes in third trimester    Chronic hypertension affecting pregnancy    Obesity affecting pregnancy in third trimester       Start Time: 0637  Stop Time: 0750    Interpretation A  Nonstress Test Interpretation A: Reactive  Comments A: Appropriate for gestational age and admitting diagnosis.  Interpretation B  Nonstress Test Interpretation B: Reactive  Comments B: Appropriate for gestational age and admitting diagnosis.

## 2024-11-18 NOTE — NON STRESS TEST
Nancy SARABJIT Xiebarber, a  at 28w3d with an TYRELL of 2025, by Other Basis, was seen at [address] for a nonstress test.    Chief Complaint   Patient presents with    Rupture of Membranes     MARBIN - Patient states she had a large gush around 0930 today. She has had some light trickling off and on since. +FM, -Vaginal bleeding but had some brown discharge yesterday.        Patient Active Problem List   Diagnosis     premature rupture of membranes in third trimester    Chronic hypertension affecting pregnancy    Obesity affecting pregnancy in third trimester       Start Time: 1452  Stop Time: 1559    Interpretation A  Nonstress Test Interpretation A: Reactive  Comments A: Appropriate for gestational age and admitting diagnosis  Interpretation B  Nonstress Test Interpretation B: Reactive  Comments B: Appropriate for gestational age and admitting diagnosis.

## 2024-11-18 NOTE — PLAN OF CARE
Goal Outcome Evaluation:  Plan of Care Reviewed With: patient        Progress: no change  Outcome Evaluation: Severe range BP but BP meds had not been given; repeat later per MD was elevated so MD ordered IR procardia and increase XL procardia to BID, following BP mild-range; Denies HA/vision changes/RUQ pain/edema/ctx/cramping/foul odor/pain/tenderness. No additional concerns/complaints per patient.

## 2024-11-18 NOTE — NON STRESS TEST
Nancy SARABJIT Xiebarber, a  at 28w3d with an TYRELL of 2025, by Other Basis, was seen at [address] for a nonstress test.    Chief Complaint   Patient presents with    Rupture of Membranes     MARBIN - Patient states she had a large gush around 0930 today. She has had some light trickling off and on since. +FM, -Vaginal bleeding but had some brown discharge yesterday.        Patient Active Problem List   Diagnosis     premature rupture of membranes in third trimester    Chronic hypertension affecting pregnancy    Obesity affecting pregnancy in third trimester       Start Time:   Stop Time:     Interpretation A  Nonstress Test Interpretation A: Reactive  Interpretation B  Nonstress Test Interpretation B: Reactive

## 2024-11-18 NOTE — PROGRESS NOTES
[facility]  Obstetric Progress Note    Patient Name: Nancy Cormier  :  1990  MRN:  [id number]  Hospital Day: 3  EGA: 28w3d      Subjective   Denies cramping, bleeding, chest pain, shortness of breath, headache, vision changes, RUQ pain. Good FM x both babies.      Objective     VS:  Vital Signs Range for the last 24 hours  Temperature: Temp:  [98.1 °F (36.7 °C)-98.6 °F (37 °C)] 98.3 °F (36.8 °C)   Temp Source: Temp src: Oral   BP: BP: (136-170)/() 143/95   Pulse: Heart Rate:  [61-82] 81   Respirations: Resp:  [16-18] 16                   Physical Examination:     General :  Alert in NAD  Abdomen: Gravid, Fundus - nontender    SCE: /-1 on last check 11/15/24       Results from last 7 days   Lab Units 11/15/24  1510 11/15/24  1155   WBC 10*3/mm3  --  15.65*   HEMOGLOBIN g/dL  --  13.4   PLATELETS 10*3/mm3  --  397   POTASSIUM mmol/L  --  4.1   ALT (SGPT) U/L  --  15   AST (SGOT) U/L  --  20   CREATININE mg/dL  --  0.63   BILIRUBIN mg/dL  --  0.2   PROT/CREAT RATIO UR mg/G Crea 196.4  --          ROM+ positive      A/P: 33 y.o.  at 28w3d with di/di twin gestation admitted with PPROM (twin A)    PPROM - occurred 11/15/24 @ 0930  Latency antibiotics, day 4  S/p Mag for NP,- to restart if delivery is imminent   S/p BMZ on 11/15/24 & 24 at 1137,   Inpatient until delivery at 34wks or earlier if indicated    Fetal status  Di/di twin gestation   NST TID - reassuring.   BMZ as above for FLM  BPP Tu/Fri  Growth US q4wks    Apical VSD Twin B  Appreciate MFM input  S/p fetal ECHO  OK for delivery at [facility] per Dr. Rodriguez  Plan  ECHO    CHTN - No evidence of pre-eclampsia at this time.   Continue Carvedilol 25mg BID and ProcardiaXL 30mg BID  Weekly PIH labs  Continue LDA    H/o SVT  S/p Cardiology and MFM consults - appreciate recs  EKG - sinus rhythm, delayed R wave progression per Cardiology note  Normal ECHO in past  Plan telemetry during labor (if vaginal delivery) and for  6hrs postpartum     BMI 42  SCDs while hospitalized     Prenatal care  Due for 1hr GCT, will plan after completion of steroids              premature rupture of membranes in third trimester    Chronic hypertension affecting pregnancy    Obesity affecting pregnancy in third trimester              Jenna Miranda, APRN  2024  10:02 EST

## 2024-11-19 ENCOUNTER — APPOINTMENT (OUTPATIENT)
Dept: ULTRASOUND IMAGING | Facility: HOSPITAL | Age: 34
End: 2024-11-19
Payer: COMMERCIAL

## 2024-11-19 LAB
ABO GROUP BLD: NORMAL
ALBUMIN SERPL-MCNC: 3 G/DL (ref 3.5–5.2)
ALBUMIN/GLOB SERPL: 1.1 G/DL
ALP SERPL-CCNC: 82 U/L (ref 39–117)
ALT SERPL W P-5'-P-CCNC: 12 U/L (ref 1–33)
ANION GAP SERPL CALCULATED.3IONS-SCNC: 11.4 MMOL/L (ref 5–15)
AST SERPL-CCNC: 14 U/L (ref 1–32)
BASOPHILS # BLD AUTO: 0.05 10*3/MM3 (ref 0–0.2)
BASOPHILS NFR BLD AUTO: 0.3 % (ref 0–1.5)
BILIRUB SERPL-MCNC: <0.2 MG/DL (ref 0–1.2)
BLD GP AB SCN SERPL QL: NEGATIVE
BUN SERPL-MCNC: 9 MG/DL (ref 6–20)
BUN/CREAT SERPL: 16.7 (ref 7–25)
CALCIUM SPEC-SCNC: 8.8 MG/DL (ref 8.6–10.5)
CHLORIDE SERPL-SCNC: 104 MMOL/L (ref 98–107)
CO2 SERPL-SCNC: 19.6 MMOL/L (ref 22–29)
CREAT SERPL-MCNC: 0.54 MG/DL (ref 0.57–1)
DEPRECATED RDW RBC AUTO: 36.6 FL (ref 37–54)
EGFRCR SERPLBLD CKD-EPI 2021: 124.9 ML/MIN/1.73
EOSINOPHIL # BLD AUTO: 0.17 10*3/MM3 (ref 0–0.4)
EOSINOPHIL NFR BLD AUTO: 0.9 % (ref 0.3–6.2)
ERYTHROCYTE [DISTWIDTH] IN BLOOD BY AUTOMATED COUNT: 11.3 % (ref 12.3–15.4)
EXPIRATION DATE: NORMAL
GLOBULIN UR ELPH-MCNC: 2.8 GM/DL
GLUCOSE SERPL-MCNC: 90 MG/DL (ref 65–99)
HCT VFR BLD AUTO: 35.6 % (ref 34–46.6)
HGB BLD-MCNC: 12.2 G/DL (ref 12–15.9)
IMM GRANULOCYTES # BLD AUTO: 0.18 10*3/MM3 (ref 0–0.05)
IMM GRANULOCYTES NFR BLD AUTO: 1 % (ref 0–0.5)
LYMPHOCYTES # BLD AUTO: 3.68 10*3/MM3 (ref 0.7–3.1)
LYMPHOCYTES NFR BLD AUTO: 19.5 % (ref 19.6–45.3)
Lab: NORMAL
MCH RBC QN AUTO: 30.9 PG (ref 26.6–33)
MCHC RBC AUTO-ENTMCNC: 34.3 G/DL (ref 31.5–35.7)
MCV RBC AUTO: 90.1 FL (ref 79–97)
MONOCYTES # BLD AUTO: 1.44 10*3/MM3 (ref 0.1–0.9)
MONOCYTES NFR BLD AUTO: 7.6 % (ref 5–12)
NEUTROPHILS NFR BLD AUTO: 13.36 10*3/MM3 (ref 1.7–7)
NEUTROPHILS NFR BLD AUTO: 70.7 % (ref 42.7–76)
NRBC BLD AUTO-RTO: 0 /100 WBC (ref 0–0.2)
PLATELET # BLD AUTO: 361 10*3/MM3 (ref 140–450)
PMV BLD AUTO: 10.1 FL (ref 6–12)
POTASSIUM SERPL-SCNC: 4.1 MMOL/L (ref 3.5–5.2)
PROT SERPL-MCNC: 5.8 G/DL (ref 6–8.5)
PROT UR STRIP-MCNC: NEGATIVE MG/DL
RBC # BLD AUTO: 3.95 10*6/MM3 (ref 3.77–5.28)
RH BLD: POSITIVE
SODIUM SERPL-SCNC: 135 MMOL/L (ref 136–145)
T&S EXPIRATION DATE: NORMAL
WBC NRBC COR # BLD AUTO: 18.88 10*3/MM3 (ref 3.4–10.8)

## 2024-11-19 PROCEDURE — 93976 VASCULAR STUDY: CPT | Performed by: STUDENT IN AN ORGANIZED HEALTH CARE EDUCATION/TRAINING PROGRAM

## 2024-11-19 PROCEDURE — 76816 OB US FOLLOW-UP PER FETUS: CPT | Performed by: STUDENT IN AN ORGANIZED HEALTH CARE EDUCATION/TRAINING PROGRAM

## 2024-11-19 PROCEDURE — 86901 BLOOD TYPING SEROLOGIC RH(D): CPT | Performed by: OBSTETRICS & GYNECOLOGY

## 2024-11-19 PROCEDURE — 81002 URINALYSIS NONAUTO W/O SCOPE: CPT | Performed by: OBSTETRICS & GYNECOLOGY

## 2024-11-19 PROCEDURE — 93976 VASCULAR STUDY: CPT

## 2024-11-19 PROCEDURE — 86850 RBC ANTIBODY SCREEN: CPT | Performed by: OBSTETRICS & GYNECOLOGY

## 2024-11-19 PROCEDURE — 76819 FETAL BIOPHYS PROFIL W/O NST: CPT | Performed by: STUDENT IN AN ORGANIZED HEALTH CARE EDUCATION/TRAINING PROGRAM

## 2024-11-19 PROCEDURE — 25010000002 MAGNESIUM SULFATE 20 GM/500ML SOLUTION: Performed by: STUDENT IN AN ORGANIZED HEALTH CARE EDUCATION/TRAINING PROGRAM

## 2024-11-19 PROCEDURE — 76819 FETAL BIOPHYS PROFIL W/O NST: CPT

## 2024-11-19 PROCEDURE — 76820 UMBILICAL ARTERY ECHO: CPT

## 2024-11-19 PROCEDURE — 76820 UMBILICAL ARTERY ECHO: CPT | Performed by: STUDENT IN AN ORGANIZED HEALTH CARE EDUCATION/TRAINING PROGRAM

## 2024-11-19 PROCEDURE — 83735 ASSAY OF MAGNESIUM: CPT | Performed by: NURSE PRACTITIONER

## 2024-11-19 PROCEDURE — 59025 FETAL NON-STRESS TEST: CPT

## 2024-11-19 PROCEDURE — 80053 COMPREHEN METABOLIC PANEL: CPT | Performed by: STUDENT IN AN ORGANIZED HEALTH CARE EDUCATION/TRAINING PROGRAM

## 2024-11-19 PROCEDURE — 99232 SBSQ HOSP IP/OBS MODERATE 35: CPT | Performed by: STUDENT IN AN ORGANIZED HEALTH CARE EDUCATION/TRAINING PROGRAM

## 2024-11-19 PROCEDURE — 25010000002 LABETALOL 5 MG/ML SOLUTION: Performed by: OBSTETRICS & GYNECOLOGY

## 2024-11-19 PROCEDURE — 85025 COMPLETE CBC W/AUTO DIFF WBC: CPT | Performed by: STUDENT IN AN ORGANIZED HEALTH CARE EDUCATION/TRAINING PROGRAM

## 2024-11-19 PROCEDURE — 86900 BLOOD TYPING SEROLOGIC ABO: CPT | Performed by: OBSTETRICS & GYNECOLOGY

## 2024-11-19 PROCEDURE — 25810000003 LACTATED RINGERS PER 1000 ML: Performed by: STUDENT IN AN ORGANIZED HEALTH CARE EDUCATION/TRAINING PROGRAM

## 2024-11-19 PROCEDURE — 76816 OB US FOLLOW-UP PER FETUS: CPT

## 2024-11-19 RX ORDER — NIFEDIPINE 10 MG/1
10 CAPSULE ORAL ONCE
Status: COMPLETED | OUTPATIENT
Start: 2024-11-19 | End: 2024-11-19

## 2024-11-19 RX ORDER — NIFEDIPINE 30 MG/1
30 TABLET, EXTENDED RELEASE ORAL NIGHTLY
Status: DISCONTINUED | OUTPATIENT
Start: 2024-11-19 | End: 2024-11-22

## 2024-11-19 RX ORDER — SODIUM CHLORIDE, SODIUM LACTATE, POTASSIUM CHLORIDE, CALCIUM CHLORIDE 600; 310; 30; 20 MG/100ML; MG/100ML; MG/100ML; MG/100ML
75 INJECTION, SOLUTION INTRAVENOUS CONTINUOUS
Status: ACTIVE | OUTPATIENT
Start: 2024-11-19 | End: 2024-11-20

## 2024-11-19 RX ORDER — LIDOCAINE HYDROCHLORIDE 10 MG/ML
0.5 INJECTION, SOLUTION INFILTRATION; PERINEURAL ONCE AS NEEDED
Status: CANCELLED | OUTPATIENT
Start: 2024-11-19

## 2024-11-19 RX ORDER — NIFEDIPINE 30 MG/1
30 TABLET, EXTENDED RELEASE ORAL
Status: COMPLETED | OUTPATIENT
Start: 2024-11-19 | End: 2024-11-19

## 2024-11-19 RX ORDER — NIFEDIPINE 10 MG/1
10-20 CAPSULE ORAL
Status: DISCONTINUED | OUTPATIENT
Start: 2024-11-19 | End: 2024-11-22

## 2024-11-19 RX ORDER — SODIUM CHLORIDE 9 MG/ML
40 INJECTION, SOLUTION INTRAVENOUS AS NEEDED
Status: CANCELLED | OUTPATIENT
Start: 2024-11-19

## 2024-11-19 RX ORDER — LABETALOL HYDROCHLORIDE 5 MG/ML
20-80 INJECTION, SOLUTION INTRAVENOUS
Status: DISCONTINUED | OUTPATIENT
Start: 2024-11-19 | End: 2024-11-22

## 2024-11-19 RX ORDER — HYDRALAZINE HYDROCHLORIDE 20 MG/ML
5-10 INJECTION INTRAMUSCULAR; INTRAVENOUS
Status: DISCONTINUED | OUTPATIENT
Start: 2024-11-19 | End: 2024-11-22

## 2024-11-19 RX ORDER — SODIUM CHLORIDE 0.9 % (FLUSH) 0.9 %
10 SYRINGE (ML) INJECTION EVERY 12 HOURS SCHEDULED
Status: CANCELLED | OUTPATIENT
Start: 2024-11-19

## 2024-11-19 RX ORDER — CALCIUM GLUCONATE 94 MG/ML
1 INJECTION, SOLUTION INTRAVENOUS ONCE AS NEEDED
Status: DISCONTINUED | OUTPATIENT
Start: 2024-11-19 | End: 2024-11-20

## 2024-11-19 RX ORDER — SODIUM CHLORIDE 0.9 % (FLUSH) 0.9 %
10 SYRINGE (ML) INJECTION AS NEEDED
Status: CANCELLED | OUTPATIENT
Start: 2024-11-19

## 2024-11-19 RX ORDER — MAGNESIUM SULFATE HEPTAHYDRATE 40 MG/ML
2 INJECTION, SOLUTION INTRAVENOUS CONTINUOUS
Status: DISCONTINUED | OUTPATIENT
Start: 2024-11-19 | End: 2024-11-20

## 2024-11-19 RX ORDER — NIFEDIPINE 60 MG/1
60 TABLET, EXTENDED RELEASE ORAL
Status: DISCONTINUED | OUTPATIENT
Start: 2024-11-20 | End: 2024-11-22

## 2024-11-19 RX ADMIN — SODIUM CHLORIDE, POTASSIUM CHLORIDE, SODIUM LACTATE AND CALCIUM CHLORIDE 75 ML/HR: 600; 310; 30; 20 INJECTION, SOLUTION INTRAVENOUS at 13:09

## 2024-11-19 RX ADMIN — AMOXICILLIN 500 MG: 250 CAPSULE ORAL at 18:48

## 2024-11-19 RX ADMIN — Medication 10 ML: at 08:07

## 2024-11-19 RX ADMIN — MAGNESIUM SULFATE IN WATER 2 G/HR: 20 INJECTION, SOLUTION INTRAVENOUS at 22:03

## 2024-11-19 RX ADMIN — AMOXICILLIN 500 MG: 250 CAPSULE ORAL at 02:10

## 2024-11-19 RX ADMIN — NIFEDIPINE 10 MG: 10 CAPSULE ORAL at 08:47

## 2024-11-19 RX ADMIN — NIFEDIPINE 30 MG: 30 TABLET, FILM COATED, EXTENDED RELEASE ORAL at 21:00

## 2024-11-19 RX ADMIN — NIFEDIPINE 30 MG: 30 TABLET, FILM COATED, EXTENDED RELEASE ORAL at 14:24

## 2024-11-19 RX ADMIN — LABETALOL HYDROCHLORIDE 20 MG: 5 INJECTION, SOLUTION INTRAVENOUS at 13:07

## 2024-11-19 RX ADMIN — AMOXICILLIN 500 MG: 250 CAPSULE ORAL at 12:29

## 2024-11-19 RX ADMIN — CARVEDILOL 25 MG: 25 TABLET, FILM COATED ORAL at 18:48

## 2024-11-19 RX ADMIN — ASPIRIN 81 MG: 81 TABLET, COATED ORAL at 08:07

## 2024-11-19 RX ADMIN — Medication 1 TABLET: at 08:07

## 2024-11-19 RX ADMIN — NIFEDIPINE 30 MG: 30 TABLET, FILM COATED, EXTENDED RELEASE ORAL at 08:07

## 2024-11-19 RX ADMIN — CARVEDILOL 25 MG: 25 TABLET, FILM COATED ORAL at 08:07

## 2024-11-19 NOTE — NURSING NOTE
AYAN Le notified per telephone of patient's blood pressures of 165/105 and 170/106. Patient's morning blood pressure medications are due at this time and patient has received those. Discussed how patient was started on the wrong dose of Coreg when she got admitted into the hospital. AYAN Quijano states she will order a one time dose of IR procardia and she will put the order in the computer. After patient receives her one time dose of procardia, blood pressure needs to be rechecked in 30 minutes. R/v

## 2024-11-19 NOTE — NON STRESS TEST
Nancy SARABJIT Cormier, a  at 28w4d with an TYRELL of 2025, by Other [employer], was seen at [address] for a nonstress test.    Chief Complaint   Patient presents with    Rupture of Membranes     MARBIN - Patient states she had a large gush around 0930 today. She has had some light trickling off and on since. +FM, -Vaginal bleeding but had some brown discharge yesterday.        Patient Active Problem List   Diagnosis     premature rupture of membranes in third trimester    Chronic hypertension affecting pregnancy    Obesity affecting pregnancy in third trimester       Start Time: 653  Stop Time: 075    Interpretation A  Nonstress Test Interpretation A: Reactive  Comments A: Appropriate for gestational age and admitting diagnosis  Interpretation B  Nonstress Test Interpretation B: Reactive  Comments B: appropriate for gestational age

## 2024-11-19 NOTE — PLAN OF CARE
Goal Outcome Evaluation:           Progress: no change  Outcome Evaluation: VSS, afebrile. Pt reports active fetal movement, RNST x2. Scant amount of clear, odorless fluid noted. No severe range pressures noted this shift. Pt denies HA, visual changes, RUQ pain, and N/V. Pt sleeping throughout night.

## 2024-11-19 NOTE — PLAN OF CARE
Problem: Adult Inpatient Plan of Care  Goal: Plan of Care Review  Outcome: Progressing  Flowsheets (Taken 11/19/2024 1727)  Progress: declining  Outcome Evaluation: Pt treated for severe BPs with 20 mg labetalol and magnesium sulfate started with 4g bolus and 2g continuous rate. Plan is for 12h of magnesium, to be turned off at 0120 on 11/20. Continuous FHR monitoring for Di-Di twins. Twin A - PPROM on 11/15, IUGR 8%, dopplers absent with intermittent reversed EDF, 8/8 BPP. Twin B - VSD, BPP 6/8. Pt on Coreg and Procardia dose increased today to 60A/30P.  Plan of Care Reviewed With: patient  Goal: Patient-Specific Goal (Individualized)  Outcome: Progressing  Flowsheets  Taken 11/19/2024 1727 by María Bowie RN  Patient/Family-Specific Goals (Include Timeframe): Deliver babies between 30-32 weeks.  Taken 11/15/2024 1746 by Navin Ware RN  Individualized Care Needs: Blood pressures WDL and no contractions noted  Anxieties, Fears or Concerns: [facility]  Goal: Absence of Hospital-Acquired Illness or Injury  Outcome: Progressing  Intervention: Identify and Manage Fall Risk  Recent Flowsheet Documentation  Taken 11/19/2024 1330 by María Bowie RN  Safety Promotion/Fall Prevention:   safety round/check completed   fall prevention program maintained  Intervention: Prevent Skin Injury  Recent Flowsheet Documentation  Taken 11/19/2024 1330 by María Bowie RN  Body Position: position changed independently  Intervention: Prevent and Manage VTE (Venous Thromboembolism) Risk  Recent Flowsheet Documentation  Taken 11/19/2024 1330 by María Bowie RN  VTE Prevention/Management:   bilateral   SCDs (sequential compression devices) on  Goal: Optimal Comfort and Wellbeing  Outcome: Progressing  Intervention: Provide Person-Centered Care  Recent Flowsheet Documentation  Taken 11/19/2024 1330 by María Bowie RN  Trust Relationship/Rapport:   emotional support provided   empathic listening provided   questions  answered   reassurance provided   questions encouraged   thoughts/feelings acknowledged   choices provided   care explained  Goal: Readiness for Transition of Care  Outcome: Progressing   Goal Outcome Evaluation:  Plan of Care Reviewed With: patient        Progress: declining  Outcome Evaluation: Pt treated for severe BPs with 20 mg labetalol and magnesium sulfate started with 4g bolus and 2g continuous rate. Plan is for 12h of magnesium, to be turned off at 0120 on 11/20. Continuous FHR monitoring for Di-Di twins. Twin A - PPROM on 11/15, IUGR 8%, dopplers absent with intermittent reversed EDF, 8/8 BPP. Twin B - VSD, BPP 6/8. Pt on Coreg and Procardia dose increased today to 60A/30P.

## 2024-11-19 NOTE — PROGRESS NOTES
Stopped by to talk to the [facility] parents after informed that they had additional questions.  I spent ~25 minutes speaking with both parents and both grandmothers about new development of Pre-e, magnesium, infant development and general questions about 28 weeks gestation outcomes and potential challenges. All questions were answered but I encouraged them to reach back out if they have additional questions and we would be happy to talk with them.

## 2024-11-19 NOTE — PROGRESS NOTES
MFM Consult Note     Patient name: Nancy Cormier  YOB: 1990   MRN: [id number]  Admission Date: 11/15/2024  Date of Service: 2024  Referring Provider: Baisl Chatman MD    Nancy Cormier is a 33 y.o.    at 28w4d  admitted on 11/15/2024 for  premature rupture of membranes in third trimester    Hospital day 4      Diagnoses:   Patient Active Problem List    Diagnosis     * premature rupture of membranes in third trimester [O42.913]     Chronic hypertension affecting pregnancy [O10.919]     Obesity affecting pregnancy in third trimester [O99.213]        Chief Complaint:  Chief Complaint   Patient presents with    Rupture of Membranes     MARBIN - Patient states she had a large gush around 0930 today. She has had some light trickling off and on since. +FM, -Vaginal bleeding but had some brown discharge yesterday.        Subjective:      Nancy has no complaints today.  No acute events overnight.  She is feeling normal fetal movement. She is continuing to have leakage of fluid to clear.  Denies any odor.  Denies any abdominal pain or contractions.  No current headache, vision changes, right upper quadrant pain, or significant change in edema.    Objective:     Vitals:  Vitals:    24 1330 24 1331 24 1335 24 1340   BP:  135/80  134/86   BP Location:       Patient Position:       Pulse: 83 73 79 77   Resp:       Temp:       TempSrc:       SpO2: 95%  95% 94%   Weight:       Height:               PHYSICAL EXAM   General: No acute distress  Respiratory: No increased work of breathing  Cardiac: reg rate   Abdominal: Gravid, nontender  Extremities: No significant edema  Neuro/psych: Alert and oriented, appropriate mood        Non-Stress Test:    A: 125 bpm, mod blanka, pos accels 10x10. No deceleration   B: 140 BPM, MOD blanka, pos accels 10x10, no Deceleration   Ambia: No contractions       Most recent ultrasound: 2024  Please view full ultrasound note  on Imaging tab in [employer].    Dichorionic diamniotic twin gestation  A: Inferior right  Cephalic presentation  Anterior placenta  MVP 2.1 cm which is normal  Fetal growth restriction is noted today.  EFW 41%, AC 8%  Umbilical artery Dopplers are absent with intermittent reversed end diastolic flow.   BPP 8/8     B: Superior left  Breech presentation  Posterior placenta  MVP 6.6 cm which is normal  Fetal biometry is consistent with dates EFW 45%, AC 36%  Umbilical artery Dopplers are overall normal. No evidence of absent or resversed end diastolic flow.   BPP 6/8 (-2 for breathing)  VSD is noted     Medications:  amoxicillin, 500 mg, Oral, Q8H  aspirin, 81 mg, Oral, Daily  carvedilol, 25 mg, Oral, BID With Meals  NIFEdipine XL, 30 mg, Oral, BID  prenatal vitamin, 1 tablet, Oral, Daily  sodium chloride, 10 mL, Intravenous, Q12H         PRN Medications:    acetaminophen    bisacodyl    calcium gluconate    docusate sodium    hydrALAZINE **OR** labetalol **OR** NIFEdipine    ondansetron ODT **OR** ondansetron    sodium chloride    sodium chloride    sodium chloride    Labs:  Lab Results (last 72 hours)       ** No results found for the last 72 hours. **          Lab Results   Component Value Date    HGB 13.4 11/15/2024         Assessment/Plan:      Nancy is a 33 y.o.    at 28w4d.  1.  premature rupture of membranes in third trimester:  Currently on day 4 out of 7 of latency antibiotics  No signs of infection or abruption    2. Fetal growth restriction of twin A, absent end diastolic flow, intermittent reversed  On today's ultrasound fetal growth restriction was diagnosed. We discussed that fetal growth restriction is defined as an EFW <10%ile or an abdominal circumference <10%ile. She meets criteria by AC 8%ile. We discussed the various etiologies of fetal growth restrictions including maternal conditions, fetal conditions including anomalies and genetic differences, and placental insufficiency.  Given the  specific case I informed her that I suspect that this is likely secondary to chronic hypertension, placental insufficiency and twin gestation.    Reviewed that we are currently seeing signs of significant placental resistance with persistent absent end-diastolic flow for fetus a with intermittent reversed end-diastolic flow.  We discussed that absent and reversed end-diastolic flow is associated with an increased risk of stillbirth therefore we will continue to monitor 3 times daily for 1 hour.  We discussed that we will also increase the frequency of ultrasounds to 3 times weekly.  Last, we reviewed delivery timing, continues to be intermittent reversed end-diastolic flow, we will recommend delivery at 30 to 32 weeks pending the overall clinical situation.    Fetus B continues to be normally grown with normal umbilical artery Dopplers.    3. Chronic hypertension with superimposed Preeclampsia with severe features   Concern for developing superimposed severe features given labile blood pressures.  Has had multiple severe range blood pressures since admission on Sunday.  Has had multiple consecutive severe range blood pressures.  Recommend initiation of magnesium sulfate for seizure prophylaxis.  X 12 hours  Recommend IV antihypertensive protocol for 2 blood pressures that are greater than 160 systolic or greater than 110 diastolic 15 minutes apart  Recommend completion of CBC/CMP daily x 3 days then spacing to 2-3 times weekly.  Continue Coreg 25 mg.  Recommended increasing Procardia 30 mg twice daily to 60 mg in AM and 30 mg in PM.  will give 1 dose now.     4. H/o SVT   S/p cardiology consult   Maintain Mag >2, K+ >4   Telemetry in labor and 6 hours postpartum     5. Fetal well being   S/p NICU consult  S/p BTMS   TID monitoring x 1 hour   3x weekly ultrasounds for umbilical artery doppler, will complete tomorrow and Friday. Then M/W/F.        MODE OF DELIVERY - per fetal presentation; discussed with patient  regarding concerns of fetus A's ability to tolerate labor given abnormal doppler studies.      DISPOSITION - Inpatient until delivery, currently 30-32 weeks if reversed end diastolic flow is persistent; will continue to evaluate delivery timing.         I have seen and examined the patient for 45 minutes. More than >50% of the time was face-to-face patient counseling. All patient questions were answered, and patient concerns addressed.  This note has been routed to the referring obstetricians/medical provider. Thank you for requesting this clinical consult.        All questions were answered to the best of my ability.  Ursula Wiley MD   Maternal Fetal Medicine-[facility]  Office: [phone]

## 2024-11-19 NOTE — NON STRESS TEST
Nancy SARABJIT Cormier, a  at 28w3d with an TYRELL of 2025, by Other [employer], was seen at [address] for a nonstress test.    Chief Complaint   Patient presents with    Rupture of Membranes     MARBIN - Patient states she had a large gush around 0930 today. She has had some light trickling off and on since. +FM, -Vaginal bleeding but had some brown discharge yesterday.        Patient Active Problem List   Diagnosis     premature rupture of membranes in third trimester    Chronic hypertension affecting pregnancy    Obesity affecting pregnancy in third trimester       Start Time:   Stop Time:     Interpretation A  Nonstress Test Interpretation A: Reactive  Comments A: Appropriate for gestational age and admitting diagnosis  Interpretation B  Nonstress Test Interpretation B: Reactive  Comments B: appropriate for gestational age

## 2024-11-19 NOTE — PROGRESS NOTES
[facility]  Obstetric Progress Note    Patient Name: Nancy Cormier  :  1990  MRN:  [id number]  Hospital Day: 4  EGA: 28w3d      Subjective   Denies cramping, bleeding, chest pain, shortness of breath, headache, vision changes, RUQ pain. Good FM x both babies.      Objective     VS:  Vital Signs Range for the last 24 hours  Temperature: Temp:  [97.9 °F (36.6 °C)-98.2 °F (36.8 °C)] 97.9 °F (36.6 °C)   Temp Source: Temp src: Oral   BP: BP: (131-170)/() 170/106   Pulse: Heart Rate:  [72-86] 72   Respirations: Resp:  [16-18] 18                   Physical Examination:     General :  Alert in NAD  Abdomen: Gravid, Fundus - nontender    SCE: /-1 on last check 11/15/24       Results from last 7 days   Lab Units 11/15/24  1510 11/15/24  1155   WBC 10*3/mm3  --  15.65*   HEMOGLOBIN g/dL  --  13.4   PLATELETS 10*3/mm3  --  397   POTASSIUM mmol/L  --  4.1   ALT (SGPT) U/L  --  15   AST (SGOT) U/L  --  20   CREATININE mg/dL  --  0.63   BILIRUBIN mg/dL  --  0.2   PROT/CREAT RATIO UR mg/G Crea 196.4  --          ROM+ positive      A/P: 33 y.o.  at 28w4d with di/di twin gestation admitted with PPROM (twin A)    PPROM - occurred 11/15/24 @ 0930  Latency antibiotics, day 5  S/p Mag for NP,- to restart if delivery is imminent   S/p BMZ on 11/15/24 & 24 at 1137,   Inpatient until delivery at 34wks or earlier if indicated    Fetal status  Di/di twin gestation   NST TID - reassuring.   BMZ as above for FLM  BPP /: pending  Growth US q4wks    Apical VSD Twin B  Appreciate MFM input  S/p fetal ECHO  OK for delivery at [facility] per Dr. Rodriguez  Plan  ECHO    CHTN - No evidence of pre-eclampsia at this time.   Continue Carvedilol 25mg BID and ProcardiaXL 30mg BID  Weekly PIH labs  Continue LDA  BP elevated this AM--nifedipine IR 10mg--repeat lower. Continue to monitor    H/o SVT  S/p Cardiology and MFM consults - appreciate recs  EKG - sinus rhythm, delayed R wave progression per  Cardiology note  Normal ECHO in past  Plan telemetry during labor (if vaginal delivery) and for 6hrs postpartum     BMI 42  SCDs while hospitalized     Prenatal care  Due for 1hr GCT, will plan after completion of steroids--scheduled for               premature rupture of membranes in third trimester    Chronic hypertension affecting pregnancy    Obesity affecting pregnancy in third trimester              Jenna Miranda, APRN  2024  10:12 EST

## 2024-11-20 ENCOUNTER — APPOINTMENT (OUTPATIENT)
Dept: ULTRASOUND IMAGING | Facility: HOSPITAL | Age: 34
End: 2024-11-20
Payer: COMMERCIAL

## 2024-11-20 LAB — MAGNESIUM SERPL-MCNC: 3.4 MG/DL (ref 1.6–2.6)

## 2024-11-20 PROCEDURE — 76819 FETAL BIOPHYS PROFIL W/O NST: CPT | Performed by: STUDENT IN AN ORGANIZED HEALTH CARE EDUCATION/TRAINING PROGRAM

## 2024-11-20 PROCEDURE — 76820 UMBILICAL ARTERY ECHO: CPT

## 2024-11-20 PROCEDURE — 93976 VASCULAR STUDY: CPT

## 2024-11-20 PROCEDURE — 93976 VASCULAR STUDY: CPT | Performed by: STUDENT IN AN ORGANIZED HEALTH CARE EDUCATION/TRAINING PROGRAM

## 2024-11-20 PROCEDURE — 76820 UMBILICAL ARTERY ECHO: CPT | Performed by: STUDENT IN AN ORGANIZED HEALTH CARE EDUCATION/TRAINING PROGRAM

## 2024-11-20 PROCEDURE — 99232 SBSQ HOSP IP/OBS MODERATE 35: CPT | Performed by: NURSE PRACTITIONER

## 2024-11-20 PROCEDURE — 76819 FETAL BIOPHYS PROFIL W/O NST: CPT

## 2024-11-20 PROCEDURE — 59025 FETAL NON-STRESS TEST: CPT

## 2024-11-20 RX ORDER — DOCUSATE SODIUM 100 MG/1
100 CAPSULE, LIQUID FILLED ORAL 2 TIMES DAILY
Status: DISCONTINUED | OUTPATIENT
Start: 2024-11-20 | End: 2024-11-22

## 2024-11-20 RX ADMIN — ASPIRIN 81 MG: 81 TABLET, COATED ORAL at 09:46

## 2024-11-20 RX ADMIN — Medication 10 ML: at 09:45

## 2024-11-20 RX ADMIN — CARVEDILOL 25 MG: 25 TABLET, FILM COATED ORAL at 17:53

## 2024-11-20 RX ADMIN — Medication 1 TABLET: at 09:46

## 2024-11-20 RX ADMIN — AMOXICILLIN 500 MG: 250 CAPSULE ORAL at 03:42

## 2024-11-20 RX ADMIN — CARVEDILOL 25 MG: 25 TABLET, FILM COATED ORAL at 07:55

## 2024-11-20 RX ADMIN — Medication 10 ML: at 20:59

## 2024-11-20 RX ADMIN — NIFEDIPINE 30 MG: 30 TABLET, FILM COATED, EXTENDED RELEASE ORAL at 20:59

## 2024-11-20 RX ADMIN — AMOXICILLIN 500 MG: 250 CAPSULE ORAL at 17:53

## 2024-11-20 RX ADMIN — AMOXICILLIN 500 MG: 250 CAPSULE ORAL at 11:20

## 2024-11-20 RX ADMIN — DOCUSATE SODIUM 100 MG: 100 CAPSULE, LIQUID FILLED ORAL at 20:59

## 2024-11-20 RX ADMIN — NIFEDIPINE 60 MG: 60 TABLET, FILM COATED, EXTENDED RELEASE ORAL at 08:00

## 2024-11-20 NOTE — NON STRESS TEST
Nancy SARABJIT Xiebarber, a  at 28w5d with an TYRELL of 2025, by Other [employer], was seen at [address] for a nonstress test.    Chief Complaint   Patient presents with    Rupture of Membranes     MARBIN - Patient states she had a large gush around 0930 today. She has had some light trickling off and on since. +FM, -Vaginal bleeding but had some brown discharge yesterday.        Patient Active Problem List   Diagnosis     premature rupture of membranes in third trimester    Chronic hypertension affecting pregnancy    Obesity affecting pregnancy in third trimester       Start Time: 1618  Stop Time: 174    Interpretation A  Nonstress Test Interpretation A: Reactive  Comments A: Appropriate for gestational age  Interpretation B  Nonstress Test Interpretation B: Reactive  Comments B: Appropriate for gestational age

## 2024-11-20 NOTE — PLAN OF CARE
Problem: Adult Inpatient Plan of Care  Goal: Plan of Care Review  Outcome: Progressing  Flowsheets (Taken 2024 7546)  Outcome Evaluation: Patient tolerated magnesium infusion well. Infusion turned off at 0130, after two hours of continued EFM and magnesium assessments, patient was removed from the monitor reporting good fetal movement.  Goal: Patient-Specific Goal (Individualized)  Outcome: Progressing  Goal: Absence of Hospital-Acquired Illness or Injury  Outcome: Progressing  Goal: Optimal Comfort and Wellbeing  Outcome: Progressing  Goal: Readiness for Transition of Care  Outcome: Progressing     Problem: Skin Injury Risk Increased  Goal: Skin Health and Integrity  Outcome: Progressing     Problem:  Fall Injury Risk  Goal: Absence of Fall, Infant Drop and Related Injury  Outcome: Progressing     Problem: Hypertensive Disorders in Pregnancy  Goal: Patient-Fetal Stabilization  Outcome: Progressing     Problem: Prelabor Rupture of Membranes  Goal: Absence of Infection  Outcome: Progressing  Goal: Absence of Infection  Outcome: Progressing   Goal Outcome Evaluation:              Outcome Evaluation: Patient tolerated magnesium infusion well. Infusion turned off at 0130, after two hours of continued EFM and magnesium assessments, patient was removed from the monitor reporting good fetal movement.

## 2024-11-20 NOTE — PROGRESS NOTES
Daily Progress Note    Patient name: Nancy Cormier  YOB: 1990   MRN: [id number]  Admission Date: 11/15/2024  Date of Service: 2024  Referring Provider: Basil Chatman MD    Nancy Cormier is a 33 y.o.    at 28w5d  admitted on 11/15/2024 for  premature rupture of membranes in third trimester    Hospital day 5    Diagnoses:   Patient Active Problem List    Diagnosis     * premature rupture of membranes in third trimester [O42.913]     Chronic hypertension affecting pregnancy [O10.919]     Obesity affecting pregnancy in third trimester [O99.213]        Chief Complaint:  Chief Complaint   Patient presents with    Rupture of Membranes     MARBIN - Patient states she had a large gush around 0930 today. She has had some light trickling off and on since. +FM, -Vaginal bleeding but had some brown discharge yesterday.        Subjective:      Nancy has no complaints today.  No acute events overnight.  Reports fetal movement is normal  Denies leakage of amniotic fluid.  Denies vaginal bleeding  She denies headache, vision changes, shortness of breath, acute changes in edema, and RUQ pain.   Objective:     Vitals:  Vitals:    24 0320 24 0325 24 0330 24 0945   BP:    112/56   BP Location:    Right arm   Patient Position:    Lying   Pulse: 91 91 89 94   Resp:    17   Temp:    97.9 °F (36.6 °C)   TempSrc:    Oral   SpO2: 96% 96% 97% 97%   Weight:       Height:         PHYSICAL EXAM   GENERAL: Not in acute distress, AAOx3, pleasant  CARDIO: regular rate and rhythm  PULM: symmetric chest rise, speaking in complete sentences without difficulty  NEURO: awake, alert and oriented to person, place, and time  ABDOMINAL: No fundal tenderness, no rebound or guarding, gravid  EXTREMITIES: no bilateral lower extremity edema/tenderness  SKIN: Warm, well-perfused     {Nonstress Test:    Most recent ultrasound: 2024  Please view full ultrasound note on Imaging  tab   Dichorionic diamniotic twin gestation     Twin A: Inferior right  Cephalic presentation  MVP 1.7 cm; 1.7 X 1.2cm pocket consistent with oligohydramnios  Umbilical artery Dopplers are absent. There is no evidence of reversed end-diastolic flow.  BPP 6/8 (-2 for fluid)     Twin B: Superior left  Breech presentation  MVP 5.8 cm which is normal  Umbilical artery Dopplers are elevated without evidence of absent or reversed end-diastolic flow.  BPP 8/8  VSD is noted      Medications:  amoxicillin, 500 mg, Oral, Q8H  aspirin, 81 mg, Oral, Daily  carvedilol, 25 mg, Oral, BID With Meals  NIFEdipine XL, 30 mg, Oral, Nightly  NIFEdipine XL, 60 mg, Oral, Q24H  prenatal vitamin, 1 tablet, Oral, Daily  sodium chloride, 10 mL, Intravenous, Q12H         PRN Medications:    acetaminophen    bisacodyl    docusate sodium    hydrALAZINE **OR** labetalol **OR** NIFEdipine    ondansetron ODT **OR** ondansetron    sodium chloride    sodium chloride    sodium chloride    Labs:  Lab Results (last 72 hours)       Procedure Component Value Units Date/Time    POC Protein, Urine, Qualitative, Dipstick [[id number]] Collected: 11/19/24 1600    Specimen: Urine Updated: 11/19/24 1656     Protein, POC Negative mg/dL      Lot Number [id number]     Expiration Date 12/31/25    Comprehensive Metabolic Panel [[id number]]  (Abnormal) Collected: 11/19/24 1359    Specimen: Blood Updated: 11/19/24 1452     Glucose 90 mg/dL      BUN 9 mg/dL      Creatinine 0.54 mg/dL      Sodium 135 mmol/L      Potassium 4.1 mmol/L      Chloride 104 mmol/L      CO2 19.6 mmol/L      Calcium 8.8 mg/dL      Total Protein 5.8 g/dL      Albumin 3.0 g/dL      ALT (SGPT) 12 U/L      AST (SGOT) 14 U/L      Alkaline Phosphatase 82 U/L      Total Bilirubin <0.2 mg/dL      Globulin 2.8 gm/dL      A/G Ratio 1.1 g/dL      BUN/Creatinine Ratio 16.7     Anion Gap 11.4 mmol/L      eGFR 124.9 mL/min/1.73     Narrative:      GFR Normal >60  Chronic Kidney Disease <60  Kidney Failure <15       CBC & Differential [[id number]]  (Abnormal) Collected: 24 1359    Specimen: Blood Updated: 24 1430    Narrative:      The following orders were created for panel order CBC & Differential.  Procedure                               Abnormality         Status                     ---------                               -----------         ------                     CBC Auto Differential[[id number]]        Abnormal            Final result                 Please view results for these tests on the individual orders.    CBC Auto Differential [[id number]]  (Abnormal) Collected: 24 1359    Specimen: Blood Updated: 24 1430     WBC 18.88 10*3/mm3      RBC 3.95 10*6/mm3      Hemoglobin 12.2 g/dL      Hematocrit 35.6 %      MCV 90.1 fL      MCH 30.9 pg      MCHC 34.3 g/dL      RDW 11.3 %      RDW-SD 36.6 fl      MPV 10.1 fL      Platelets 361 10*3/mm3      Neutrophil % 70.7 %      Lymphocyte % 19.5 %      Monocyte % 7.6 %      Eosinophil % 0.9 %      Basophil % 0.3 %      Immature Grans % 1.0 %      Neutrophils, Absolute 13.36 10*3/mm3      Lymphocytes, Absolute 3.68 10*3/mm3      Monocytes, Absolute 1.44 10*3/mm3      Eosinophils, Absolute 0.17 10*3/mm3      Basophils, Absolute 0.05 10*3/mm3      Immature Grans, Absolute 0.18 10*3/mm3      nRBC 0.0 /100 WBC           Lab Results   Component Value Date    HGB 12.2 2024         Assessment/Plan:      Nancy is a 33 y.o.    at 28w5d.     PREMATURE RUPTURE OF MEMBRANES IN THIRD TRIMESTER  -Currently on day 5 out of 7 of latency antibiotics  -No signs of infection or abruption  -S/P betamethasone (11/15 and )     2.  FETAL GROWTH RESTRICTION OF TWIN A, AEDF and iREDF  -NST TID x 1 hour  -BPP with UA dopplers three times per week (Monday/Wednesday/Friday)  -Recommend delivery by 32 weeks if we continue to see REDF   -S/P NICU Consult Consults by Lizzy Ellis APRN (11/15/2024 10:08)     3. CHRONIC HYPERTENSION WITH SUPERIMPOSED  PREECLAMPSIA WITH SEVERE FEATURES   -Continue aspirin 81 mg daily  -S/P Magnesium Sulfate, restart if delivery is iminent  -Recommend IV antihypertensive protocol for 2 blood pressures > 160 / >110 15 min apart  -Daily CBC/CMP/Magnesium until Friday and then 2 or 3 times weekly  -Continue Coreg 25 mg BID  -Continue procardia 60 mg QAM   -Continue procardia 30 mg QPM    4. APICAL VSD - TWIN B   -S/P Fetal Echo  Progress Notes by Moshe Rodriguez MD (2024 09:45)    -Plan  Echo   -Ok to deliver at [facility]     4. HISTORY OF SVT  -S/p cardiology consult Consults by Carlton Swenson MD (11/15/2024 17:00)   -S/P EKG ECG 12 Lead Rhythm Change (11/15/2024 16:26)   -Maintain Mag >2 and  K+ >4    -Telemetry in labor and 6 hours postpartum      5. FETAL: NSTs TID x 1 hour; BPP with UAD's three times weekly (Mon/Wed/Fri), growth q 3-4 weeks.        MODE OF DELIVERY - per fetal presentation; discussed with patient regarding concerns of fetus A's ability to tolerate labor given abnormal doppler studies.      DISPOSITION - Inpatient until delivery, currently 30-32 weeks if reversed end diastolic flow is persistent; will continue to evaluate delivery timing.      I have seen and examined the patient for 35 minutes. More than >50% of the time was face-to-face patient counseling. All patient questions were answered, and patient concerns addressed.  This note has been routed to the referring obstetricians/medical provider. Thank you for requesting this clinical consult.      All questions were answered to the best of my ability.    AYAN Cassidy  2024  Maternal Fetal Medicine-[facility]  Office: [phone]  [email or web address]

## 2024-11-20 NOTE — PROGRESS NOTES
[facility]  Obstetric Progress Note    Patient Name: Nancy Cormier  :  1990  MRN:  [id number]  Hospital Day: 5  EGA: 28w5d      Subjective   Denies cramping, bleeding, chest pain, shortness of breath, headache, vision changes, RUQ pain. Good FM x both babies.      Objective     VS:  Vital Signs Range for the last 24 hours  Temperature: Temp:  [97.7 °F (36.5 °C)-98.7 °F (37.1 °C)] 97.9 °F (36.6 °C)   Temp Source: Temp src: Oral   BP: BP: (111-164)/() 112/56   Pulse: Heart Rate:  [66-94] 94   Respirations: Resp:  [16-18] 17                   Physical Examination:     General :  Alert in NAD  Abdomen: Gravid, Fundus - nontender    SCE: /-1 on last check 11/15/24       Results from last 7 days   Lab Units 24  1359 11/15/24  1510 11/15/24  1155   WBC 10*3/mm3 18.88*  --  15.65*   HEMOGLOBIN g/dL 12.2  --  13.4   PLATELETS 10*3/mm3 361  --  397   POTASSIUM mmol/L 4.1  --  4.1   ALT (SGPT) U/L 12  --  15   AST (SGOT) U/L 14  --  20   CREATININE mg/dL 0.54*  --  0.63   BILIRUBIN mg/dL <0.2  --  0.2   PROT/CREAT RATIO UR mg/G Crea  --  196.4  --          ROM+ positive      A/P: 33 y.o.  at 28w5d with di/di twin gestation admitted with PPROM (twin A)    PPROM - occurred 11/15/24 @ 0930  Latency antibiotics, day 5  S/p Mag for NP,- to restart if delivery is imminent   Restarted yesterday afternoon for 12 hours  S/p BMZ on 11/15/24 & 24 at 1137,   Inpatient until delivery at 34wks or earlier if indicated    Fetal status  Di/di twin gestation   NST TID - reassuring.   BMZ as above for FLM  BPP MWF  : Twin A with absent and iREDF, BPP 8/8; Twin B with normal dopplers BPP 6/8  : Twin A with absent dopplers BPP 6/8 (-2 for fluid) and Twin B with elevated dopplers BPP 8/8  Growth US q4wks    Apical VSD Twin B  Appreciate MFM input  S/p fetal ECHO  OK for delivery at [facility] per Dr. Rodriguez  Plan  ECHO    CHTN - No evidence of pre-eclampsia at this time.   Continue  Carvedilol 25mg BID and ProcardiaXL 30mg BID  Weekly PIH labs  Continue LDA  BP elevated to severe range yesterday. IV protocol initiated and meds increased to procardia 60mg in AM and 30mg PM    H/o SVT  S/p Cardiology and MFM consults - appreciate recs  EKG - sinus rhythm, delayed R wave progression per Cardiology note  Normal ECHO in past  Plan telemetry during labor (if vaginal delivery) and for 6hrs postpartum     BMI 42  SCDs while hospitalized     Prenatal care  Due for 1hr GCT, will plan after completion of steroids--scheduled for               premature rupture of membranes in third trimester    Chronic hypertension affecting pregnancy    Obesity affecting pregnancy in third trimester              Jenna Miranda, AYAN  2024  13:08 EST

## 2024-11-20 NOTE — NON STRESS TEST
Nancy WHIPPLE Rojasbarber, a  at 28w5d with an TYRELL of 2025, by Other Basis, was seen at [address] for a nonstress test.    Chief Complaint   Patient presents with    Rupture of Membranes     MARBIN - Patient states she had a large gush around 0930 today. She has had some light trickling off and on since. +FM, -Vaginal bleeding but had some brown discharge yesterday.        Patient Active Problem List   Diagnosis     premature rupture of membranes in third trimester    Chronic hypertension affecting pregnancy    Obesity affecting pregnancy in third trimester       Start Time: 956  Stop Time: 111    Interpretation A  Nonstress Test Interpretation A: Reactive  Comments A: Appropriate for gestational age  Interpretation B  Nonstress Test Interpretation B: Reactive  Comments B: appropriate for gestational age

## 2024-11-21 LAB
ALBUMIN SERPL-MCNC: 3 G/DL (ref 3.5–5.2)
ALBUMIN/GLOB SERPL: 1.1 G/DL
ALP SERPL-CCNC: 84 U/L (ref 39–117)
ALT SERPL W P-5'-P-CCNC: 15 U/L (ref 1–33)
ANION GAP SERPL CALCULATED.3IONS-SCNC: 11 MMOL/L (ref 5–15)
AST SERPL-CCNC: 15 U/L (ref 1–32)
BILIRUB SERPL-MCNC: <0.2 MG/DL (ref 0–1.2)
BUN SERPL-MCNC: 11 MG/DL (ref 6–20)
BUN/CREAT SERPL: 20.4 (ref 7–25)
CALCIUM SPEC-SCNC: 8.9 MG/DL (ref 8.6–10.5)
CHLORIDE SERPL-SCNC: 104 MMOL/L (ref 98–107)
CO2 SERPL-SCNC: 19 MMOL/L (ref 22–29)
CREAT SERPL-MCNC: 0.54 MG/DL (ref 0.57–1)
DEPRECATED RDW RBC AUTO: 36.3 FL (ref 37–54)
EGFRCR SERPLBLD CKD-EPI 2021: 124.9 ML/MIN/1.73
ERYTHROCYTE [DISTWIDTH] IN BLOOD BY AUTOMATED COUNT: 11.5 % (ref 12.3–15.4)
GLOBULIN UR ELPH-MCNC: 2.8 GM/DL
GLUCOSE SERPL-MCNC: 87 MG/DL (ref 65–99)
HCT VFR BLD AUTO: 35.9 % (ref 34–46.6)
HGB BLD-MCNC: 12.3 G/DL (ref 12–15.9)
MAGNESIUM SERPL-MCNC: 2.4 MG/DL (ref 1.6–2.6)
MCH RBC QN AUTO: 30.5 PG (ref 26.6–33)
MCHC RBC AUTO-ENTMCNC: 34.3 G/DL (ref 31.5–35.7)
MCV RBC AUTO: 89.1 FL (ref 79–97)
PLATELET # BLD AUTO: 340 10*3/MM3 (ref 140–450)
PMV BLD AUTO: 10.1 FL (ref 6–12)
POTASSIUM SERPL-SCNC: 4.3 MMOL/L (ref 3.5–5.2)
PROT SERPL-MCNC: 5.8 G/DL (ref 6–8.5)
RBC # BLD AUTO: 4.03 10*6/MM3 (ref 3.77–5.28)
SODIUM SERPL-SCNC: 134 MMOL/L (ref 136–145)
WBC NRBC COR # BLD AUTO: 17.69 10*3/MM3 (ref 3.4–10.8)

## 2024-11-21 PROCEDURE — 83735 ASSAY OF MAGNESIUM: CPT | Performed by: NURSE PRACTITIONER

## 2024-11-21 PROCEDURE — 85027 COMPLETE CBC AUTOMATED: CPT | Performed by: NURSE PRACTITIONER

## 2024-11-21 PROCEDURE — 80053 COMPREHEN METABOLIC PANEL: CPT | Performed by: NURSE PRACTITIONER

## 2024-11-21 PROCEDURE — 59025 FETAL NON-STRESS TEST: CPT

## 2024-11-21 RX ADMIN — AMOXICILLIN 500 MG: 250 CAPSULE ORAL at 03:01

## 2024-11-21 RX ADMIN — Medication 10 ML: at 09:01

## 2024-11-21 RX ADMIN — NIFEDIPINE 30 MG: 30 TABLET, FILM COATED, EXTENDED RELEASE ORAL at 22:12

## 2024-11-21 RX ADMIN — Medication 10 ML: at 23:17

## 2024-11-21 RX ADMIN — NIFEDIPINE 60 MG: 60 TABLET, FILM COATED, EXTENDED RELEASE ORAL at 09:00

## 2024-11-21 RX ADMIN — DOCUSATE SODIUM 100 MG: 100 CAPSULE, LIQUID FILLED ORAL at 22:12

## 2024-11-21 RX ADMIN — ASPIRIN 81 MG: 81 TABLET, COATED ORAL at 09:00

## 2024-11-21 RX ADMIN — AMOXICILLIN 500 MG: 250 CAPSULE ORAL at 11:34

## 2024-11-21 RX ADMIN — AMOXICILLIN 500 MG: 250 CAPSULE ORAL at 18:12

## 2024-11-21 RX ADMIN — CARVEDILOL 25 MG: 25 TABLET, FILM COATED ORAL at 09:00

## 2024-11-21 RX ADMIN — Medication 1 TABLET: at 09:00

## 2024-11-21 RX ADMIN — CARVEDILOL 25 MG: 25 TABLET, FILM COATED ORAL at 18:12

## 2024-11-21 RX ADMIN — DOCUSATE SODIUM 100 MG: 100 CAPSULE, LIQUID FILLED ORAL at 09:00

## 2024-11-21 NOTE — NON STRESS TEST
Nancy WHIPPLE Rojasbarber, a  at 28w6d with an TYRELL of 2025, by Other Basis, was seen at [address] for a nonstress test.    Chief Complaint   Patient presents with    Rupture of Membranes     MARBIN - Patient states she had a large gush around 0930 today. She has had some light trickling off and on since. +FM, -Vaginal bleeding but had some brown discharge yesterday.        Patient Active Problem List   Diagnosis     premature rupture of membranes in third trimester    Chronic hypertension affecting pregnancy    Obesity affecting pregnancy in third trimester       Start Time: 904  Stop Time: 103    Interpretation A  Nonstress Test Interpretation A: Reactive  Comments A: Appropriate for gestational age  Interpretation B  Nonstress Test Interpretation B: Reactive  Comments B: Appropriate for gestational age

## 2024-11-21 NOTE — PLAN OF CARE
Goal Outcome Evaluation:  Plan of Care Reviewed With: patient           Outcome Evaluation: RNST for baby A and baby B, +FM for both babies, VSS and afebrile. BP's were 123/77, 125/78, and 131/75.  Denied headache, visual problems or epigastric pain, Denied VB/LOF, regular and painful contractions. Abdomen palpated soft. Patient received her amoxil around 0300. Slept well  overnight. cbc,cmp, magnesium to be drawn in the morning

## 2024-11-21 NOTE — PLAN OF CARE
Goal Outcome Evaluation:  Plan of Care Reviewed With: patient        Progress: no change  Outcome Evaluation: RNSTx2, +FM pt states babys are more active today but did not do kick counts, VSS, no fever, only having to change pad once per day, no bleeding or contractions. no new complaints

## 2024-11-21 NOTE — NON STRESS TEST
Nancy SARABJIT Xiebarber, a  at 28w5d with an TYRELL of 2025, by Other Basis, was seen at [address] for a nonstress test.    Chief Complaint   Patient presents with    Rupture of Membranes     MARBNI - Patient states she had a large gush around 0930 today. She has had some light trickling off and on since. +FM, -Vaginal bleeding but had some brown discharge yesterday.        Patient Active Problem List   Diagnosis     premature rupture of membranes in third trimester    Chronic hypertension affecting pregnancy    Obesity affecting pregnancy in third trimester       Start Time:   Stop Time:     Interpretation A  Nonstress Test Interpretation A: Reactive  Comments A: Appropriate for gestational age  Interpretation B  Nonstress Test Interpretation B: Reactive  Comments B: Appropriate for gestational age      REACTIVE for baby A and baby B

## 2024-11-22 ENCOUNTER — APPOINTMENT (OUTPATIENT)
Dept: GENERAL RADIOLOGY | Facility: HOSPITAL | Age: 34
End: 2024-11-22
Payer: COMMERCIAL

## 2024-11-22 ENCOUNTER — ANESTHESIA (OUTPATIENT)
Dept: LABOR AND DELIVERY | Facility: HOSPITAL | Age: 34
End: 2024-11-22
Payer: COMMERCIAL

## 2024-11-22 ENCOUNTER — ANESTHESIA EVENT (OUTPATIENT)
Dept: LABOR AND DELIVERY | Facility: HOSPITAL | Age: 34
End: 2024-11-22
Payer: COMMERCIAL

## 2024-11-22 ENCOUNTER — APPOINTMENT (OUTPATIENT)
Dept: ULTRASOUND IMAGING | Facility: HOSPITAL | Age: 34
End: 2024-11-22
Payer: COMMERCIAL

## 2024-11-22 LAB
ALBUMIN SERPL-MCNC: 2.9 G/DL (ref 3.5–5.2)
ALBUMIN/GLOB SERPL: 0.9 G/DL
ALP SERPL-CCNC: 87 U/L (ref 39–117)
ALT SERPL W P-5'-P-CCNC: 13 U/L (ref 1–33)
ANION GAP SERPL CALCULATED.3IONS-SCNC: 12.1 MMOL/L (ref 5–15)
AST SERPL-CCNC: 11 U/L (ref 1–32)
BILIRUB SERPL-MCNC: <0.2 MG/DL (ref 0–1.2)
BUN SERPL-MCNC: 11 MG/DL (ref 6–20)
BUN/CREAT SERPL: 20.8 (ref 7–25)
CALCIUM SPEC-SCNC: 9 MG/DL (ref 8.6–10.5)
CHLORIDE SERPL-SCNC: 105 MMOL/L (ref 98–107)
CO2 SERPL-SCNC: 18.9 MMOL/L (ref 22–29)
CREAT SERPL-MCNC: 0.53 MG/DL (ref 0.57–1)
DEPRECATED RDW RBC AUTO: 39.6 FL (ref 37–54)
EGFRCR SERPLBLD CKD-EPI 2021: 125.4 ML/MIN/1.73
ERYTHROCYTE [DISTWIDTH] IN BLOOD BY AUTOMATED COUNT: 11.8 % (ref 12.3–15.4)
GLOBULIN UR ELPH-MCNC: 3.4 GM/DL
GLUCOSE SERPL-MCNC: 78 MG/DL (ref 65–99)
HCT VFR BLD AUTO: 37 % (ref 34–46.6)
HGB BLD-MCNC: 12.1 G/DL (ref 12–15.9)
MAGNESIUM SERPL-MCNC: 1.9 MG/DL (ref 1.6–2.6)
MCH RBC QN AUTO: 30.1 PG (ref 26.6–33)
MCHC RBC AUTO-ENTMCNC: 32.7 G/DL (ref 31.5–35.7)
MCV RBC AUTO: 92 FL (ref 79–97)
PLATELET # BLD AUTO: 334 10*3/MM3 (ref 140–450)
PMV BLD AUTO: 10.6 FL (ref 6–12)
POTASSIUM SERPL-SCNC: 3.8 MMOL/L (ref 3.5–5.2)
PROT SERPL-MCNC: 6.3 G/DL (ref 6–8.5)
RBC # BLD AUTO: 4.02 10*6/MM3 (ref 3.77–5.28)
SODIUM SERPL-SCNC: 136 MMOL/L (ref 136–145)
WBC NRBC COR # BLD AUTO: 19.58 10*3/MM3 (ref 3.4–10.8)

## 2024-11-22 PROCEDURE — 85027 COMPLETE CBC AUTOMATED: CPT | Performed by: NURSE PRACTITIONER

## 2024-11-22 PROCEDURE — C1755 CATHETER, INTRASPINAL: HCPCS | Performed by: ANESTHESIOLOGY

## 2024-11-22 PROCEDURE — 25010000002 ROPIVACAINE PER 1 MG: Performed by: NURSE ANESTHETIST, CERTIFIED REGISTERED

## 2024-11-22 PROCEDURE — 83735 ASSAY OF MAGNESIUM: CPT | Performed by: NURSE PRACTITIONER

## 2024-11-22 PROCEDURE — 25010000002 FENTANYL CITRATE (PF) 100 MCG/2ML SOLUTION: Performed by: NURSE ANESTHETIST, CERTIFIED REGISTERED

## 2024-11-22 PROCEDURE — 88307 TISSUE EXAM BY PATHOLOGIST: CPT

## 2024-11-22 PROCEDURE — 80053 COMPREHEN METABOLIC PANEL: CPT | Performed by: NURSE PRACTITIONER

## 2024-11-22 PROCEDURE — 74018 RADEX ABDOMEN 1 VIEW: CPT

## 2024-11-22 RX ORDER — OXYTOCIN/0.9 % SODIUM CHLORIDE 30/500 ML
999 PLASTIC BAG, INJECTION (ML) INTRAVENOUS ONCE AS NEEDED
Status: COMPLETED | OUTPATIENT
Start: 2024-11-22 | End: 2024-11-22

## 2024-11-22 RX ORDER — TRANEXAMIC ACID 10 MG/ML
1000 INJECTION, SOLUTION INTRAVENOUS ONCE AS NEEDED
Status: DISCONTINUED | OUTPATIENT
Start: 2024-11-22 | End: 2024-11-22

## 2024-11-22 RX ORDER — NIFEDIPINE 30 MG/1
30 TABLET, EXTENDED RELEASE ORAL
Status: DISCONTINUED | OUTPATIENT
Start: 2024-11-23 | End: 2024-11-22

## 2024-11-22 RX ORDER — MISOPROSTOL 200 UG/1
800 TABLET ORAL ONCE AS NEEDED
Status: DISCONTINUED | OUTPATIENT
Start: 2024-11-22 | End: 2024-11-22

## 2024-11-22 RX ORDER — BISACODYL 10 MG
10 SUPPOSITORY, RECTAL RECTAL DAILY PRN
Status: DISCONTINUED | OUTPATIENT
Start: 2024-11-23 | End: 2024-11-23 | Stop reason: HOSPADM

## 2024-11-22 RX ORDER — HYDROXYZINE HYDROCHLORIDE 50 MG/1
50 TABLET, FILM COATED ORAL NIGHTLY PRN
Status: DISCONTINUED | OUTPATIENT
Start: 2024-11-22 | End: 2024-11-23 | Stop reason: HOSPADM

## 2024-11-22 RX ORDER — HYDROCORTISONE 25 MG/G
CREAM TOPICAL AS NEEDED
Status: DISCONTINUED | OUTPATIENT
Start: 2024-11-22 | End: 2024-11-23 | Stop reason: HOSPADM

## 2024-11-22 RX ORDER — IBUPROFEN 600 MG/1
600 TABLET, FILM COATED ORAL EVERY 6 HOURS PRN
Status: DISCONTINUED | OUTPATIENT
Start: 2024-11-22 | End: 2024-11-23 | Stop reason: HOSPADM

## 2024-11-22 RX ORDER — NIFEDIPINE 30 MG/1
30 TABLET, EXTENDED RELEASE ORAL
Status: DISCONTINUED | OUTPATIENT
Start: 2024-11-22 | End: 2024-11-22

## 2024-11-22 RX ORDER — PRENATAL VIT/IRON FUM/FOLIC AC 27MG-0.8MG
1 TABLET ORAL DAILY
Status: DISCONTINUED | OUTPATIENT
Start: 2024-11-22 | End: 2024-11-22 | Stop reason: SDUPTHER

## 2024-11-22 RX ORDER — NIFEDIPINE 30 MG/1
30 TABLET, EXTENDED RELEASE ORAL NIGHTLY
Status: DISCONTINUED | OUTPATIENT
Start: 2024-11-22 | End: 2024-11-22

## 2024-11-22 RX ORDER — ROPIVACAINE HYDROCHLORIDE 2 MG/ML
INJECTION, SOLUTION EPIDURAL; INFILTRATION; PERINEURAL AS NEEDED
Status: DISCONTINUED | OUTPATIENT
Start: 2024-11-22 | End: 2024-11-22 | Stop reason: SURG

## 2024-11-22 RX ORDER — ONDANSETRON 4 MG/1
4 TABLET, ORALLY DISINTEGRATING ORAL EVERY 8 HOURS PRN
Status: DISCONTINUED | OUTPATIENT
Start: 2024-11-22 | End: 2024-11-23 | Stop reason: HOSPADM

## 2024-11-22 RX ORDER — TRANEXAMIC ACID 10 MG/ML
1000 INJECTION, SOLUTION INTRAVENOUS ONCE AS NEEDED
Status: DISCONTINUED | OUTPATIENT
Start: 2024-11-22 | End: 2024-11-23 | Stop reason: HOSPADM

## 2024-11-22 RX ORDER — PROMETHAZINE HYDROCHLORIDE 12.5 MG/1
12.5 SUPPOSITORY RECTAL EVERY 6 HOURS PRN
Status: DISCONTINUED | OUTPATIENT
Start: 2024-11-22 | End: 2024-11-23 | Stop reason: HOSPADM

## 2024-11-22 RX ORDER — ZOLPIDEM TARTRATE 5 MG/1
5 TABLET ORAL NIGHTLY PRN
Status: DISCONTINUED | OUTPATIENT
Start: 2024-11-22 | End: 2024-11-22

## 2024-11-22 RX ORDER — PROMETHAZINE HYDROCHLORIDE 25 MG/1
25 TABLET ORAL EVERY 6 HOURS PRN
Status: DISCONTINUED | OUTPATIENT
Start: 2024-11-22 | End: 2024-11-23 | Stop reason: HOSPADM

## 2024-11-22 RX ORDER — MISOPROSTOL 200 UG/1
600 TABLET ORAL ONCE AS NEEDED
Status: DISCONTINUED | OUTPATIENT
Start: 2024-11-22 | End: 2024-11-23 | Stop reason: HOSPADM

## 2024-11-22 RX ORDER — METHYLERGONOVINE MALEATE 0.2 MG/ML
200 INJECTION INTRAVENOUS
Status: DISCONTINUED | OUTPATIENT
Start: 2024-11-22 | End: 2024-11-22

## 2024-11-22 RX ORDER — DOCUSATE SODIUM 100 MG/1
100 CAPSULE, LIQUID FILLED ORAL 2 TIMES DAILY
Status: DISCONTINUED | OUTPATIENT
Start: 2024-11-22 | End: 2024-11-23 | Stop reason: HOSPADM

## 2024-11-22 RX ORDER — FENTANYL CITRATE 50 UG/ML
INJECTION, SOLUTION INTRAMUSCULAR; INTRAVENOUS AS NEEDED
Status: DISCONTINUED | OUTPATIENT
Start: 2024-11-22 | End: 2024-11-22 | Stop reason: SURG

## 2024-11-22 RX ORDER — OXYTOCIN/0.9 % SODIUM CHLORIDE 30/500 ML
PLASTIC BAG, INJECTION (ML) INTRAVENOUS
Status: COMPLETED
Start: 2024-11-22 | End: 2024-11-22

## 2024-11-22 RX ORDER — CALCIUM CARBONATE 500 MG/1
2 TABLET, CHEWABLE ORAL 3 TIMES DAILY PRN
Status: DISCONTINUED | OUTPATIENT
Start: 2024-11-22 | End: 2024-11-23 | Stop reason: HOSPADM

## 2024-11-22 RX ORDER — NIFEDIPINE 30 MG/1
30 TABLET, EXTENDED RELEASE ORAL
Status: DISCONTINUED | OUTPATIENT
Start: 2024-11-23 | End: 2024-11-22 | Stop reason: SDUPTHER

## 2024-11-22 RX ORDER — NIFEDIPINE 30 MG/1
30 TABLET, EXTENDED RELEASE ORAL DAILY
Status: DISCONTINUED | OUTPATIENT
Start: 2024-11-23 | End: 2024-11-22

## 2024-11-22 RX ORDER — METHYLERGONOVINE MALEATE 0.2 MG/ML
200 INJECTION INTRAVENOUS ONCE AS NEEDED
Status: DISCONTINUED | OUTPATIENT
Start: 2024-11-22 | End: 2024-11-23 | Stop reason: HOSPADM

## 2024-11-22 RX ORDER — NIFEDIPINE 30 MG/1
30 TABLET, EXTENDED RELEASE ORAL NIGHTLY
Status: DISCONTINUED | OUTPATIENT
Start: 2024-11-23 | End: 2024-11-22

## 2024-11-22 RX ORDER — NIFEDIPINE 30 MG/1
30 TABLET, EXTENDED RELEASE ORAL ONCE
Status: COMPLETED | OUTPATIENT
Start: 2024-11-22 | End: 2024-11-22

## 2024-11-22 RX ORDER — CARBOPROST TROMETHAMINE 250 UG/ML
250 INJECTION, SOLUTION INTRAMUSCULAR
Status: DISCONTINUED | OUTPATIENT
Start: 2024-11-22 | End: 2024-11-22

## 2024-11-22 RX ORDER — CALCIUM CARBONATE 500 MG/1
2 TABLET, CHEWABLE ORAL 3 TIMES DAILY PRN
Status: DISCONTINUED | OUTPATIENT
Start: 2024-11-22 | End: 2024-11-22

## 2024-11-22 RX ORDER — NIFEDIPINE 30 MG/1
30 TABLET, EXTENDED RELEASE ORAL
Status: DISCONTINUED | OUTPATIENT
Start: 2024-11-23 | End: 2024-11-23 | Stop reason: HOSPADM

## 2024-11-22 RX ORDER — OXYTOCIN/0.9 % SODIUM CHLORIDE 30/500 ML
125 PLASTIC BAG, INJECTION (ML) INTRAVENOUS CONTINUOUS PRN
Status: DISCONTINUED | OUTPATIENT
Start: 2024-11-22 | End: 2024-11-23 | Stop reason: HOSPADM

## 2024-11-22 RX ORDER — OXYTOCIN/0.9 % SODIUM CHLORIDE 30/500 ML
250 PLASTIC BAG, INJECTION (ML) INTRAVENOUS CONTINUOUS
Status: DISPENSED | OUTPATIENT
Start: 2024-11-22 | End: 2024-11-22

## 2024-11-22 RX ORDER — KETOROLAC TROMETHAMINE 15 MG/ML
15 INJECTION, SOLUTION INTRAMUSCULAR; INTRAVENOUS EVERY 6 HOURS PRN
Status: ACTIVE | OUTPATIENT
Start: 2024-11-22 | End: 2024-11-22

## 2024-11-22 RX ORDER — ERYTHROMYCIN 5 MG/G
OINTMENT OPHTHALMIC
Status: ACTIVE
Start: 2024-11-22 | End: 2024-11-22

## 2024-11-22 RX ORDER — OXYTOCIN/0.9 % SODIUM CHLORIDE 30/500 ML
125 PLASTIC BAG, INJECTION (ML) INTRAVENOUS ONCE AS NEEDED
Status: DISCONTINUED | OUTPATIENT
Start: 2024-11-22 | End: 2024-11-22

## 2024-11-22 RX ORDER — OXYTOCIN/0.9 % SODIUM CHLORIDE 30/500 ML
999 PLASTIC BAG, INJECTION (ML) INTRAVENOUS ONCE
Status: DISCONTINUED | OUTPATIENT
Start: 2024-11-22 | End: 2024-11-22 | Stop reason: HOSPADM

## 2024-11-22 RX ORDER — ONDANSETRON 2 MG/ML
4 INJECTION INTRAMUSCULAR; INTRAVENOUS EVERY 6 HOURS PRN
Status: DISCONTINUED | OUTPATIENT
Start: 2024-11-22 | End: 2024-11-23 | Stop reason: HOSPADM

## 2024-11-22 RX ORDER — ACETAMINOPHEN 325 MG/1
650 TABLET ORAL EVERY 6 HOURS PRN
Status: DISCONTINUED | OUTPATIENT
Start: 2024-11-22 | End: 2024-11-23 | Stop reason: HOSPADM

## 2024-11-22 RX ORDER — TRAMADOL HYDROCHLORIDE 50 MG/1
50 TABLET ORAL EVERY 6 HOURS PRN
Status: DISCONTINUED | OUTPATIENT
Start: 2024-11-22 | End: 2024-11-23 | Stop reason: HOSPADM

## 2024-11-22 RX ADMIN — FENTANYL CITRATE 25 MCG: 50 INJECTION INTRAMUSCULAR; INTRAVENOUS at 07:27

## 2024-11-22 RX ADMIN — AMOXICILLIN 500 MG: 250 CAPSULE ORAL at 03:49

## 2024-11-22 RX ADMIN — FENTANYL CITRATE 75 MCG: 50 INJECTION INTRAMUSCULAR; INTRAVENOUS at 07:28

## 2024-11-22 RX ADMIN — NIFEDIPINE 60 MG: 60 TABLET, FILM COATED, EXTENDED RELEASE ORAL at 09:14

## 2024-11-22 RX ADMIN — ACETAMINOPHEN 325MG 650 MG: 325 TABLET ORAL at 03:49

## 2024-11-22 RX ADMIN — CARVEDILOL 25 MG: 25 TABLET, FILM COATED ORAL at 17:08

## 2024-11-22 RX ADMIN — ROPIVACAINE HYDROCHLORIDE 10 MG: 2 INJECTION, SOLUTION EPIDURAL; INFILTRATION at 07:26

## 2024-11-22 RX ADMIN — NIFEDIPINE 30 MG: 30 TABLET, FILM COATED, EXTENDED RELEASE ORAL at 18:03

## 2024-11-22 RX ADMIN — CARVEDILOL 25 MG: 25 TABLET, FILM COATED ORAL at 09:42

## 2024-11-22 RX ADMIN — Medication 125 ML/HR: at 09:11

## 2024-11-22 RX ADMIN — ZOLPIDEM TARTRATE 5 MG: 5 TABLET, FILM COATED ORAL at 04:38

## 2024-11-22 RX ADMIN — Medication 999 ML/HR: at 08:25

## 2024-11-22 RX ADMIN — DOCUSATE SODIUM 100 MG: 100 CAPSULE, LIQUID FILLED ORAL at 20:51

## 2024-11-22 RX ADMIN — ANTACID TABLETS 2 TABLET: 500 TABLET, CHEWABLE ORAL at 01:07

## 2024-11-22 NOTE — ANESTHESIA POSTPROCEDURE EVALUATION
"Patient: Nancy Cormier    Procedure Summary       Date: 24 Room / Location:  [facility]    Anesthesia Start: 704 Anesthesia Stop: 735    Procedure:  SECTION PRIMARY (Abdomen) Diagnosis:     Surgeons: Basil Chatman MD Provider: Debby Norman MD    Anesthesia Type: epidural ASA Status: 2 - Emergent            Anesthesia Type: epidural    Vitals  Vitals Value Taken Time   /94 24 0815   Temp     Pulse 91 24 0815   Resp     SpO2 100 % 24 0814   Vitals shown include unfiled device data.        Post Anesthesia Care and Evaluation      Comments: /92 (BP Location: Right arm, Patient Position: Sitting)   Pulse 78   Temp 36.9 °C (98.4 °F) (Oral)   Resp 16   Ht 175.3 cm (69\")   Wt 126 kg (278 lb 1.6 oz)   SpO2 96%   Breastfeeding Yes   BMI 41.07 kg/m²     MD was available throughout the duration of the labor epidural.      "

## 2024-11-22 NOTE — NURSING NOTE
Tank RONQUILLO notified via phone pts /11 upon arrival to [facility]. Orders to given Coreg 25mg now and recheck in 20min. If BP is still not within normal range, orders to start hypertensive protocol and RN will notify MD if protocol started.

## 2024-11-22 NOTE — NON STRESS TEST
Nancy WHIPPLE Rojasbarber, a  at 29w0d with an TYRELL of 2025, by Other Basis, was seen at [address] for a nonstress test.    Chief Complaint   Patient presents with    Rupture of Membranes     MARBIN - Patient states she had a large gush around 0930 today. She has had some light trickling off and on since. +FM, -Vaginal bleeding but had some brown discharge yesterday.        Patient Active Problem List   Diagnosis     premature rupture of membranes in third trimester    Chronic hypertension affecting pregnancy    Obesity affecting pregnancy in third trimester       Start Time: 339  Stop Time: 422    Interpretation A  Nonstress Test Interpretation A: Reactive  Comments A: Appropriate for gestational age  Interpretation B  Nonstress Test Interpretation B: Reactive  Comments B: Appropriate for gestational age        Pt put on monitor for complaints of pain in lower back, abdomen, and pelvis. No contractions palpated or noted on monitor.

## 2024-11-22 NOTE — NURSING NOTE
Tank RONQUILLO notified via phone BP is 140/97 and pt is complaining of headache. MD states to give procardia XL 30mg now and to keep orders to give procardia XL 30mg in the morning too. RN will continue to monitor.

## 2024-11-22 NOTE — LACTATION NOTE
G1, P2- set of 29 weeks twins, who got transferred earlier to [facility].. Mom stayed for 6 hours in L&D. Went to see her there. She has been using the HGP given per her RN. PT is concerned is not getting anything, so hand expression demonstrated and many drops of colostrum noticed. Encouraged PT to pump every 3h for 15 min.on each breast . Educated on the importance of stimulation for adequate milk supply. PT denies any questions. She has PBP. Her RN is ready to transfer PT to M/B, so HGP and supplies taken up to mother's new room. Encouraged her to call if needing further help.

## 2024-11-22 NOTE — PLAN OF CARE
Goal Outcome Evaluation:  Plan of Care Reviewed With: patient        Progress: no change  Outcome Evaluation: VSS. No severe range pressure noted this shift. Pt denies HA, visual changes, RUQ pain, and N/V. Pt reports active fetal movement, RNST x2. Educated pt to perform FKC each shift. Large amount of clear odorless fluid noted. Pt encouraged to increase oral intake. Pink tinged discharge noted after BM. Tylenol administered and Heat applied to back for lower back pain. Ambien given to help pt with sleep. BPP this AM.

## 2024-11-22 NOTE — NON STRESS TEST
Nancy WHIPPLE Rojasbarber, a  at 28w6d with an TYRELL of 2025, by Other Basis, was seen at [address] for a nonstress test.    Chief Complaint   Patient presents with    Rupture of Membranes     MARBIN - Patient states she had a large gush around 0930 today. She has had some light trickling off and on since. +FM, -Vaginal bleeding but had some brown discharge yesterday.        Patient Active Problem List   Diagnosis     premature rupture of membranes in third trimester    Chronic hypertension affecting pregnancy    Obesity affecting pregnancy in third trimester       Start Time: 1708  Stop Time: 1808    Interpretation A  Nonstress Test Interpretation A: Reactive  Comments A: Appropriate for gestational age  Interpretation B  Nonstress Test Interpretation B: Reactive  Comments B: Appropriate for gestational age

## 2024-11-22 NOTE — PROGRESS NOTES
Received call from RN @ 0620 that patient was more uncomfortable and complaining of increased back and pelvic pain. Placed on monitor and I went to floor to check patient.     Found to be C/C/+2 - taken to L&D OR 2 for delivery.       Basil Chatman MD   11/22/2024  08:21 EST

## 2024-11-22 NOTE — NON STRESS TEST
Nancy WHIPPLE Rojasbarber, a  at 28w6d with an TYRELL of 2025, by Other Basis, was seen at [address] for a nonstress test.    Chief Complaint   Patient presents with    Rupture of Membranes     MARBIN - Patient states she had a large gush around 0930 today. She has had some light trickling off and on since. +FM, -Vaginal bleeding but had some brown discharge yesterday.        Patient Active Problem List   Diagnosis     premature rupture of membranes in third trimester    Chronic hypertension affecting pregnancy    Obesity affecting pregnancy in third trimester       Start Time:   Stop Time:     Interpretation A  Nonstress Test Interpretation A: Reactive  Comments A: Appropriate for gestational age  Interpretation B  Nonstress Test Interpretation B: Reactive  Comments B: Appropriate for gestational age

## 2024-11-22 NOTE — ANESTHESIA PROCEDURE NOTES
Labor Epidural      Patient reassessed immediately prior to procedure    Start Time: 11/22/2024 7:20 AM  Stop Time: 11/22/2024 7:25 AM  Performed By  Anesthesiologist: Debby Norman MD  Preanesthetic Checklist  Completed: patient identified, IV checked, site marked, risks and benefits discussed, surgical consent, monitors and equipment checked, pre-op evaluation and timeout performed  Prep:  Pt Position:sitting  Sterile Tech:gloves, cap and sterile barrier  Monitoring:continuous pulse oximetry, EKG and blood pressure monitoring  Epidural Block Procedure:  Approach:midline  Location:L4-L5  Needle Type:Tuohy  Needle Gauge:20 G  Loss of Resistance Medium: saline  Cath Depth at skin:12 cm  Paresthesia: none  Aspiration:negative  Test Dose:negative  Number of Attempts: 2  Post Assessment:  Dressing:secured with tape and occlusive dressing applied  Pt Tolerance:patient tolerated the procedure well with no apparent complications  Complications:no

## 2024-11-22 NOTE — L&D DELIVERY NOTE
[facility]  Vaginal Delivery Note    Patient Name: Nancy Cormier  :  1990  MRN:  [id number]    DX:     premature rupture of membranes in third trimester    Chronic hypertension affecting pregnancy    Obesity affecting pregnancy in third trimester      Labor status: Active spontaneous labor     Delivery     Delivery: Spontaneous Vaginal Delivery x 2           YOB: 2024          Time of Birth: A: 7:09 AM    B: 7:34 AM     Anesthesia: None for A and Epidural for B      Delivering clinician: Basil Chatman MD       Infant    Findings: A: viable male, 1170 g (2lb 9.3oz) Apgars 8/9     B: Viable male, 1240 g (2lb 11.7 oz) Apgars 8/9     Placenta  Intact with 3 VC x 2                                    Repair        Lacerations: None    Estimated Blood Loss:   100 ml           Delivery narrative: Nancy Cormier is a 33 y.o.  at 29w0d with Di/Di twin gestation.  Presented on 11/15/2024 with  premature rupture membranes at 9:30 AM.  She has been hospitalized since that time received magnesium for neuroprotection as well as antibiotics for latency.  She also received betamethasone at that time.  She had been stable on the floor until very early this morning when she started to become uncomfortable complaining of back and lower pelvic pain and discomfort.  She was placed on the monitor at 6:30 AM with no real contractions noted.  I went to the floor however to check her given her complaints and she was noted to completely dilated with the presenting twin at +2 station.  She was taken urgently to labor and delivery and OR 2.  Fetal heart tones were difficult to obtain for the presenting twin given its low station in the vagina so once in the delivery room she pushed and had spontaneous vaginal delivery of a viable male infant at 7:09 AM.  Birth weight was 1170 g with Apgars assigned at 8 and 9 by the  nurse practitioner.  We were able to get fetal heart  tones of baby B in the 130s.  Bedside ultrasound was performed and showed presentation to be cephalic.  Because of the potential for needing operative delivery for the second twin and with stable fetal heart tones, epidural anesthesia was then placed.  After placement of the anesthesia the patient was placed again in lithotomy position.  Ultrasound was performed again and confirmed that the second twin was still in the cephalic presentation.  Amniotomy was performed with copious clear fluid noted and after wards the vertex was presenting through the cervix.  Patient then pushed and had spontaneous vaginal delivery of a second viable male infant at 7:34 AM birth weight was 1240 g and Apgars were 8 and 9.  Both placenta was then delivered spontaneously.  They were noted to be intact with three-vessel cords x 2.    Perineum was inspected and was without lacerations.  The mother went to the recovery room in stable condition.  Both infants went to the NICU in stable condition.  They will unfortunately need transfer as the NICU is on diversion.    Basil Chatman MD  11/22/24  08:27 EST

## 2024-11-22 NOTE — ANESTHESIA PREPROCEDURE EVALUATION
Anesthesia Evaluation     Patient summary reviewed and Nursing notes reviewed   NPO Solid Status: Waived due to emergency  NPO Liquid Status: Waived due to emergency           Airway   Mallampati: II  Dental      Pulmonary    (+) COPD, asthma,  Cardiovascular     (+) hypertension, CAD      Neuro/Psych- negative ROS  GI/Hepatic/Renal/Endo    (+) obesity, morbid obesity    Musculoskeletal (-) negative ROS    Abdominal   (+) obese   Substance History - negative use     OB/GYN    (+) Pregnant, pregnancy induced hypertension        Other                    Anesthesia Plan    ASA 2 - emergent     epidural       Anesthetic plan, risks, benefits, and alternatives have been provided, discussed and informed consent has been obtained with: patient.    CODE STATUS:    Level Of Support Discussed With: Patient  Code Status (Patient has no pulse and is not breathing): CPR (Attempt to Resuscitate)  Medical Interventions (Patient has pulse or is breathing): Full Support

## 2024-11-23 VITALS
RESPIRATION RATE: 16 BRPM | OXYGEN SATURATION: 98 % | TEMPERATURE: 98 F | HEIGHT: 69 IN | BODY MASS INDEX: 41.19 KG/M2 | WEIGHT: 278.1 LBS | SYSTOLIC BLOOD PRESSURE: 106 MMHG | DIASTOLIC BLOOD PRESSURE: 78 MMHG | HEART RATE: 93 BPM

## 2024-11-23 LAB
BASOPHILS # BLD AUTO: 0.06 10*3/MM3 (ref 0–0.2)
BASOPHILS NFR BLD AUTO: 0.4 % (ref 0–1.5)
DEPRECATED RDW RBC AUTO: 37.7 FL (ref 37–54)
EOSINOPHIL # BLD AUTO: 0.21 10*3/MM3 (ref 0–0.4)
EOSINOPHIL NFR BLD AUTO: 1.3 % (ref 0.3–6.2)
ERYTHROCYTE [DISTWIDTH] IN BLOOD BY AUTOMATED COUNT: 11.7 % (ref 12.3–15.4)
HCT VFR BLD AUTO: 34.9 % (ref 34–46.6)
HGB BLD-MCNC: 11.9 G/DL (ref 12–15.9)
IMM GRANULOCYTES # BLD AUTO: 0.18 10*3/MM3 (ref 0–0.05)
IMM GRANULOCYTES NFR BLD AUTO: 1.1 % (ref 0–0.5)
LYMPHOCYTES # BLD AUTO: 5.09 10*3/MM3 (ref 0.7–3.1)
LYMPHOCYTES NFR BLD AUTO: 30.3 % (ref 19.6–45.3)
MCH RBC QN AUTO: 30.7 PG (ref 26.6–33)
MCHC RBC AUTO-ENTMCNC: 34.1 G/DL (ref 31.5–35.7)
MCV RBC AUTO: 89.9 FL (ref 79–97)
MONOCYTES # BLD AUTO: 0.75 10*3/MM3 (ref 0.1–0.9)
MONOCYTES NFR BLD AUTO: 4.5 % (ref 5–12)
NEUTROPHILS NFR BLD AUTO: 10.51 10*3/MM3 (ref 1.7–7)
NEUTROPHILS NFR BLD AUTO: 62.4 % (ref 42.7–76)
NRBC BLD AUTO-RTO: 0 /100 WBC (ref 0–0.2)
PLATELET # BLD AUTO: 357 10*3/MM3 (ref 140–450)
PMV BLD AUTO: 9.8 FL (ref 6–12)
RBC # BLD AUTO: 3.88 10*6/MM3 (ref 3.77–5.28)
WBC NRBC COR # BLD AUTO: 16.8 10*3/MM3 (ref 3.4–10.8)

## 2024-11-23 PROCEDURE — 85025 COMPLETE CBC W/AUTO DIFF WBC: CPT | Performed by: OBSTETRICS & GYNECOLOGY

## 2024-11-23 RX ORDER — CARVEDILOL 25 MG/1
25 TABLET ORAL 2 TIMES DAILY WITH MEALS
Qty: 14 TABLET | Refills: 0 | Status: SHIPPED | OUTPATIENT
Start: 2024-11-23 | End: 2024-11-30

## 2024-11-23 RX ORDER — NIFEDIPINE 30 MG
30 TABLET, EXTENDED RELEASE ORAL
Qty: 30 TABLET | Refills: 0 | Status: SHIPPED | OUTPATIENT
Start: 2024-11-23

## 2024-11-23 RX ADMIN — Medication 1 TABLET: at 07:59

## 2024-11-23 RX ADMIN — CARVEDILOL 25 MG: 25 TABLET, FILM COATED ORAL at 10:06

## 2024-11-23 RX ADMIN — NIFEDIPINE 30 MG: 30 TABLET, FILM COATED, EXTENDED RELEASE ORAL at 07:58

## 2024-11-23 RX ADMIN — DOCUSATE SODIUM 100 MG: 100 CAPSULE, LIQUID FILLED ORAL at 07:59

## 2024-11-23 NOTE — DISCHARGE SUMMARY
Date of Discharge:  2024    Discharge Diagnosis: PPROM at 28wks,  twins 29wks    Presenting Problem/History of Present Illness   premature rupture of membranes (PPROM) delivered, current hospitalization [O42.919]  Spontaneous vaginal delivery [O80]       Hospital Course  Patient is a 33 y.o. female presented with di/di twin gestation and PPROM at 28wk0d. Pregnancy was additionally complicated by cHTN (carvedilol 25mg PO BID and Procardia XL 30mg), h/o SVT, obesity, and IUI pregnancy. She received BMZ, Mg and latency abx and did well until 29wk0d when she began to labor. She ultimately had uncomplicated  x2 yesterday morning. Babies were transferred to [place] due to [facility] on diversion so pt strongly desires d/c today.  Her BP has remained acceptable (coreg given late this AM, BP taken just prior to admin 144/104 however otherwise has been 130s/90s). Will d/c home with close obs of BP, return to office in a few days for check.     Procedures Performed   x2       Consults:   Consults       Date and Time Order Name Status Description    11/15/2024  3:53 PM Inpatient Cardiology Consult Completed     11/15/2024 11:31 AM Inpatient Consult to Neonatology Completed     11/15/2024 11:31 AM Inpatient Maternal & Fetal Medicine Consult Completed             Condition on Discharge:   Subjective   Postpartum Day 1 Vaginal Delivery.    The patient feels well without complaints.    Vital Signs  Temp:  [97.7 °F (36.5 °C)-98.2 °F (36.8 °C)] 97.7 °F (36.5 °C)  Heart Rate:  [73-98] 86  Resp:  [16] 16  BP: (120-150)/() 142/102    Physical Exam:   General: Awake and alert   Abdomen: Fundus: firm, non tender    Extremities:  Calves NT bilaterally    Assessment & Plan     PPD1  S/P  -   Stable for discharge. Instructions reviewed      Discharge Disposition  Home or Self Care    Discharge Medications     Discharge Medications        Changes to Medications        Instructions Start Date   carvedilol 25  MG tablet  Commonly known as: COREG  What changed:   medication strength  how much to take   25 mg, Oral, 2 Times Daily With Meals      NIFEdipine CC 30 MG 24 hr tablet  Commonly known as: ADALAT CC  What changed: when to take this   30 mg, Oral, Every 24 Hours Scheduled             Continue These Medications        Instructions Start Date   BABY ASPIRIN PO   162 mg, Daily      Beclomethasone Diprop HFA 80 MCG/ACT inhaler  Commonly known as: QVAR Redihaler   1 puff      CRANBERRY PO   Take  by mouth.      doxepin 10 MG capsule  Commonly known as: SINEquan   10 mg, Oral, Nightly      folic acid 1 MG tablet  Commonly known as: FOLVITE   1 mg, Daily      hydrOXYzine 50 MG tablet  Commonly known as: ATARAX    mg      levalbuterol 45 MCG/ACT inhaler  Commonly known as: XOPENEX HFA   2 puffs, Every 6 Hours PRN      nitroglycerin 0.4 MG SL tablet  Commonly known as: NITROSTAT   0.4 mg, As Needed      POTASSIUM PO   Take  by mouth.      prenatal (CLASSIC) vitamin 28-0.8 MG tablet tablet  Generic drug: prenatal vitamin   Daily                 The patient has been prescribed a controlled substance.  She has been counseled on the risks associated with using the medication.   The addictive potential of this medication and alternatives were discussed carefully with this patient and she demonstrated understanding.  A GALDINO report has been obtained and reviewed.       Activity at Discharge: restrictions reviewed    Follow-up Appointments  2-3 days for BP check, 2wk TH, 6 wk PP      Test Results Pending at Discharge       Latoya Pratt MD  11/23/24  11:35 EST

## 2024-11-23 NOTE — PLAN OF CARE
Goal Outcome Evaluation:   Discharge today. Blue band given and patient educated on band.

## 2024-12-04 ENCOUNTER — MATERNAL SCREENING (OUTPATIENT)
Dept: CALL CENTER | Facility: HOSPITAL | Age: 34
End: 2024-12-04
Payer: COMMERCIAL

## 2024-12-04 NOTE — OUTREACH NOTE
Maternal Screening Survey      Flowsheet Row Responses   [facility]   [facility]? Yes   Call start time 1110   Call end time 1115   Person spoke with today (if not patient) and relationship Patient   I have been able to laugh and see the funny side of things. 0   I have looked forward with enjoyment to things. 0   I have blamed myself unnecessarily when things went wrong. 1   I have been anxious or worried for no good reason. 0   I have felt scared or panicky for no good reason. 0   Things have been getting on top of me. 0   I have been so unhappy that I have had difficulty sleeping. 0   I have felt sad or miserable. 1   I have been so unhappy that I have been crying. 0   The thought of harming myself has occurred to me. 0   [place]  Depression Scale Total 2   Did any of your parents have problems with alcohol or drug use? No   Do any of your peers have problems with alcohol or drug use? No   Does your partner have problems with alcohol or drug use? No   Before you were pregnant did you have problems with alcohol or drug use? (past) No   In the past month, did you drink beer, wine, liquor or use any other drugs? (pregnancy) No   Maternal Screening call completed Yes   Wrap up additional comments Twins are still in the NICU, delivery at 29 weeks.   Call end time 1115              Jazmin AMBROSE - Registered Nurse
